# Patient Record
Sex: MALE | Race: BLACK OR AFRICAN AMERICAN | NOT HISPANIC OR LATINO | Employment: FULL TIME | URBAN - METROPOLITAN AREA
[De-identification: names, ages, dates, MRNs, and addresses within clinical notes are randomized per-mention and may not be internally consistent; named-entity substitution may affect disease eponyms.]

---

## 2017-01-24 ENCOUNTER — TELEPHONE (OUTPATIENT)
Dept: SPINE | Facility: CLINIC | Age: 27
End: 2017-01-24

## 2017-01-24 ENCOUNTER — OFFICE VISIT (OUTPATIENT)
Dept: INTERNAL MEDICINE | Facility: CLINIC | Age: 27
End: 2017-01-24
Payer: COMMERCIAL

## 2017-01-24 VITALS
HEART RATE: 75 BPM | TEMPERATURE: 99 F | SYSTOLIC BLOOD PRESSURE: 126 MMHG | WEIGHT: 297.38 LBS | DIASTOLIC BLOOD PRESSURE: 78 MMHG | HEIGHT: 71 IN | OXYGEN SATURATION: 96 % | BODY MASS INDEX: 41.63 KG/M2

## 2017-01-24 DIAGNOSIS — Z00.00 WELLNESS EXAMINATION: Primary | ICD-10-CM

## 2017-01-24 DIAGNOSIS — M54.16 LUMBAR RADICULITIS: ICD-10-CM

## 2017-01-24 DIAGNOSIS — M54.5 LOW BACK PAIN, UNSPECIFIED BACK PAIN LATERALITY, UNSPECIFIED CHRONICITY, WITH SCIATICA PRESENCE UNSPECIFIED: Primary | ICD-10-CM

## 2017-01-24 DIAGNOSIS — Z23 NEED FOR TDAP VACCINATION: ICD-10-CM

## 2017-01-24 PROCEDURE — 90715 TDAP VACCINE 7 YRS/> IM: CPT | Mod: S$GLB,,, | Performed by: INTERNAL MEDICINE

## 2017-01-24 PROCEDURE — 99395 PREV VISIT EST AGE 18-39: CPT | Mod: 25,S$GLB,, | Performed by: INTERNAL MEDICINE

## 2017-01-24 PROCEDURE — 90686 IIV4 VACC NO PRSV 0.5 ML IM: CPT | Mod: S$GLB,,, | Performed by: INTERNAL MEDICINE

## 2017-01-24 PROCEDURE — 99999 PR PBB SHADOW E&M-EST. PATIENT-LVL III: CPT | Mod: PBBFAC,,, | Performed by: INTERNAL MEDICINE

## 2017-01-24 PROCEDURE — 90472 IMMUNIZATION ADMIN EACH ADD: CPT | Mod: S$GLB,,, | Performed by: INTERNAL MEDICINE

## 2017-01-24 PROCEDURE — 90471 IMMUNIZATION ADMIN: CPT | Mod: S$GLB,,, | Performed by: INTERNAL MEDICINE

## 2017-01-24 RX ORDER — MELOXICAM 15 MG/1
15 TABLET ORAL DAILY PRN
Qty: 30 TABLET | Refills: 5 | Status: SHIPPED | OUTPATIENT
Start: 2017-01-24 | End: 2021-06-02 | Stop reason: SDUPTHER

## 2017-01-24 RX ORDER — GABAPENTIN 300 MG/1
300 CAPSULE ORAL 2 TIMES DAILY
Qty: 60 CAPSULE | Refills: 3 | Status: SHIPPED | OUTPATIENT
Start: 2017-01-24 | End: 2024-01-22
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
## 2017-01-24 NOTE — PROGRESS NOTES
Patient given Fluzone PF 0.5ml IM in the RD. Patient tolerated well and Band-Aid was applied. Lot#[id number] Exp:06/30/2017. Patient advised to wait in the lobby for 15 min to make sure no adverse reactions occur. Patient states verbal understanding and has no further questions.      Patient given Adacel 0.5ml IM in the LD. Patient tolerated well and Band-Aid was applied. Lot#[id number] Exp:12/10/2018. Patient advised to wait in the lobby for 15 min to make sure no adverse reactions occur. Patient states verbal understanding and has no further questions.

## 2017-01-24 NOTE — MR AVS SNAPSHOT
[facility]  [address]  [facility] [address]  Phone: [phone]  Fax: [phone]                  Perez Calabrese   2017 8:40 AM   Office Visit    Description:  Male : 1990   Provider:  John Rodriguez MD   Department:  [facility]           Reason for Visit     Low-back Pain     Hip Pain     Cough     Sputum Production           Diagnoses this Visit        Comments    Wellness examination    -  Primary     Need for Tdap vaccination         Lumbar radiculitis                To Do List           Goals (5 Years of Data)     None      Follow-Up and Disposition     Return in about 1 year (around 2018) for PE.       These Medications        Disp Refills Start End    meloxicam (MOBIC) 15 MG tablet 30 tablet 5 2017     Take 1 tablet (15 mg total) by mouth daily as needed for Pain. - Oral    Pharmacy: [id number] - [place], [place] - [address] Ph #: [phone]       gabapentin (NEURONTIN) 300 MG capsule 60 capsule 3 2017    Take 1 capsule (300 mg total) by mouth 2 (two) times daily. - Oral    Pharmacy: [id number] - [place], [place] - [address] Ph #: [phone]         [facility] On Call     [facility] On Call Nurse Care Line -  Assistance  Registered nurses in the [facility] On Call Center provide clinical advisement, health education, appointment booking, and other advisory services.  Call for this free service at [phone].             Medications           Message regarding Medications     Verify the changes and/or additions to your medication regime listed below are the same as discussed with your clinician today.  If any of these changes or additions are incorrect, please notify your healthcare provider.        START taking these NEW medications        Refills    meloxicam (MOBIC) 15 MG tablet 5     "Sig: Take 1 tablet (15 mg total) by mouth daily as needed for Pain.    Class: Normal    Route: Oral    gabapentin (NEURONTIN) 300 MG capsule 3    Sig: Take 1 capsule (300 mg total) by mouth 2 (two) times daily.    Class: Normal    Route: Oral           Verify that the below list of medications is an accurate representation of the medications you are currently taking.  If none reported, the list may be blank. If incorrect, please contact your healthcare provider. Carry this list with you in case of emergency.           Current Medications     gabapentin (NEURONTIN) 300 MG capsule Take 1 capsule (300 mg total) by mouth 2 (two) times daily.    meloxicam (MOBIC) 15 MG tablet Take 1 tablet (15 mg total) by mouth daily as needed for Pain.           Clinical Reference Information           Vital Signs - Last Recorded  Most recent update: 1/24/2017  8:56 AM by Traci Yusuf MA    BP Pulse Temp Ht Wt SpO2    126/78 75 98.6 °F (37 °C) (Oral) 5' 10.5" (1.791 m) 134.9 kg (297 lb 6.4 oz) 96%    BMI                42.07 kg/m2          Blood Pressure          Most Recent Value    BP  126/78      Allergies as of 1/24/2017     Banana Flavor (Bulk)    Pork/porcine Containing Products      Immunizations Administered on Date of Encounter - 1/24/2017     Name Date Dose VIS Date Route    TDAP 1/24/2017 0.5 mL 2/24/2015 Intramuscular    influenza - Quadrivalent - PF (ADULT) 1/24/2017 0.5 mL 8/7/2015 Intramuscular      Orders Placed During Today's Visit      Normal Orders This Visit    Ambulatory Referral to [facility] Spine Clinic     Influenza - Quadrivalent (3 years & older) (PF)     Tdap Vaccine (Adult)       "

## 2017-01-25 ENCOUNTER — HOSPITAL ENCOUNTER (OUTPATIENT)
Dept: RADIOLOGY | Facility: OTHER | Age: 27
Discharge: HOME | End: 2017-01-25
Attending: NEUROLOGICAL SURGERY
Payer: COMMERCIAL

## 2017-01-25 ENCOUNTER — OFFICE VISIT (OUTPATIENT)
Dept: SPINE | Facility: CLINIC | Age: 27
End: 2017-01-25
Payer: COMMERCIAL

## 2017-01-25 VITALS
WEIGHT: 297 LBS | HEIGHT: 71 IN | DIASTOLIC BLOOD PRESSURE: 61 MMHG | SYSTOLIC BLOOD PRESSURE: 130 MMHG | BODY MASS INDEX: 41.58 KG/M2 | HEART RATE: 77 BPM

## 2017-01-25 DIAGNOSIS — M54.14 THORACIC AND LUMBOSACRAL NEURITIS: ICD-10-CM

## 2017-01-25 DIAGNOSIS — M54.17 THORACIC AND LUMBOSACRAL NEURITIS: ICD-10-CM

## 2017-01-25 DIAGNOSIS — M54.5 LOW BACK PAIN, UNSPECIFIED BACK PAIN LATERALITY, UNSPECIFIED CHRONICITY, WITH SCIATICA PRESENCE UNSPECIFIED: ICD-10-CM

## 2017-01-25 DIAGNOSIS — M54.41 BILATERAL LOW BACK PAIN WITH BILATERAL SCIATICA, UNSPECIFIED CHRONICITY: ICD-10-CM

## 2017-01-25 DIAGNOSIS — M54.42 BILATERAL LOW BACK PAIN WITH BILATERAL SCIATICA, UNSPECIFIED CHRONICITY: ICD-10-CM

## 2017-01-25 PROCEDURE — 1159F MED LIST DOCD IN RCRD: CPT | Mod: S$GLB,,, | Performed by: PHYSICIAN ASSISTANT

## 2017-01-25 PROCEDURE — 72100 X-RAY EXAM L-S SPINE 2/3 VWS: CPT | Mod: TC

## 2017-01-25 PROCEDURE — 99204 OFFICE O/P NEW MOD 45 MIN: CPT | Mod: S$GLB,,, | Performed by: PHYSICIAN ASSISTANT

## 2017-01-25 PROCEDURE — 72100 X-RAY EXAM L-S SPINE 2/3 VWS: CPT | Mod: 26,,, | Performed by: RADIOLOGY

## 2017-01-25 PROCEDURE — 72120 X-RAY BEND ONLY L-S SPINE: CPT | Mod: 26,,, | Performed by: RADIOLOGY

## 2017-01-25 PROCEDURE — 99999 PR PBB SHADOW E&M-EST. PATIENT-LVL III: CPT | Mod: PBBFAC,,, | Performed by: PHYSICIAN ASSISTANT

## 2017-01-25 RX ORDER — METHYLPREDNISOLONE 4 MG/1
TABLET ORAL
Qty: 1 PACKAGE | Refills: 0 | Status: SHIPPED | OUTPATIENT
Start: 2017-01-25 | End: 2024-03-25

## 2017-01-25 NOTE — MR AVS SNAPSHOT
[facility]  [address]  [address]  [facility] [address]  Phone: [phone]  Fax: [phone]                  Perez Calabrese   2017 10:30 AM   Office Visit    Description:  Male : 1990   Provider:  Anya Parks PA-C   Department:  [facility]           Reason for Visit     Low-back Pain           Diagnoses this Visit        Comments    Thoracic and lumbosacral neuritis         Bilateral low back pain with bilateral sciatica, unspecified chronicity                To Do List           Goals (5 Years of Data)     None      Follow-Up and Disposition     Return if symptoms worsen or fail to improve.       These Medications        Disp Refills Start End    methylPREDNISolone (MEDROL DOSEPACK) 4 mg tablet 1 Package 0 2017     use as directed x 6 days. Do not take mobic/meloxicam with this medication.    Pharmacy: [id number] - [place], [place] - [address] Ph #: [phone]         [facility] On Call     [facility] On Call Nurse Care Line -  Assistance  Registered nurses in the [facility] On Call Center provide clinical advisement, health education, appointment booking, and other advisory services.  Call for this free service at [phone].             Medications           Message regarding Medications     Verify the changes and/or additions to your medication regime listed below are the same as discussed with your clinician today.  If any of these changes or additions are incorrect, please notify your healthcare provider.        START taking these NEW medications        Refills    methylPREDNISolone (MEDROL DOSEPACK) 4 mg tablet 0    Sig: use as directed x 6 days. Do not take mobic/meloxicam with this medication.    Class: Normal           Verify that the below list of medications is an accurate representation of the medications you are currently taking.  If none reported, the list may  "be blank. If incorrect, please contact your healthcare provider. Carry this list with you in case of emergency.           Current Medications     gabapentin (NEURONTIN) 300 MG capsule Take 1 capsule (300 mg total) by mouth 2 (two) times daily.    meloxicam (MOBIC) 15 MG tablet Take 1 tablet (15 mg total) by mouth daily as needed for Pain.    methylPREDNISolone (MEDROL DOSEPACK) 4 mg tablet use as directed x 6 days. Do not take mobic/meloxicam with this medication.           Clinical Reference Information           Vital Signs - Last Recorded  Most recent update: 1/25/2017 10:21 AM by David Palencia MA    BP Pulse Ht Wt BMI    130/61 77 5' 10.5" (1.791 m) 134.7 kg (297 lb) 42.01 kg/m2      Blood Pressure          Most Recent Value    BP  130/61      Allergies as of 1/25/2017     Banana Flavor (Bulk)    Pork/porcine Containing Products      Immunizations Administered on Date of Encounter - 1/25/2017     None      "

## 2017-01-25 NOTE — LETTER
January 25, 2017      John Rodriguez MD  [address]  [facility] [address]           [facility]  [address]  [address]  [facility] [address]  Phone: [phone]  Fax: [phone]          Patient: Perez Calabrese   MR Number: [id number]   YOB: 1990   Date of Visit: 1/25/2017       Dear Dr. John Rodriguez:    Thank you for referring Perez Calabrese to me for evaluation. Attached you will find relevant portions of my assessment and plan of care.    If you have questions, please do not hesitate to call me. I look forward to following Perez Calabrese along with you.    Sincerely,    Anya Parks PA-C    Enclosure  CC:  No Recipients    If you would like to receive this communication electronically, please contact [email or web address] or [phone] to request more information on [employer] access.    For providers and/or their staff who would like to refer a patient to [facility], please contact us through our one-stop-shop provider referral line, [facility], at [phone].    If you feel you have received this communication in error or would no longer like to receive these types of communications, please e-mail [email or web address]
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
## 2020-01-13 ENCOUNTER — PATIENT OUTREACH (OUTPATIENT)
Dept: ADMINISTRATIVE | Facility: HOSPITAL | Age: 30
End: 2020-01-13

## 2021-03-30 ENCOUNTER — IMMUNIZATION (OUTPATIENT)
Dept: PHARMACY | Facility: CLINIC | Age: 31
End: 2021-03-30
Payer: COMMERCIAL

## 2021-03-30 DIAGNOSIS — Z23 NEED FOR VACCINATION: Primary | ICD-10-CM

## 2021-04-27 ENCOUNTER — IMMUNIZATION (OUTPATIENT)
Dept: PHARMACY | Facility: CLINIC | Age: 31
End: 2021-04-27

## 2021-04-27 DIAGNOSIS — Z23 NEED FOR VACCINATION: Primary | ICD-10-CM

## 2021-06-01 ENCOUNTER — HOSPITAL ENCOUNTER (EMERGENCY)
Facility: HOSPITAL | Age: 31
Discharge: HOME | End: 2021-06-02
Attending: EMERGENCY MEDICINE

## 2021-06-01 VITALS
BODY MASS INDEX: 42.01 KG/M2 | TEMPERATURE: 99 F | HEART RATE: 99 BPM | DIASTOLIC BLOOD PRESSURE: 95 MMHG | OXYGEN SATURATION: 99 % | RESPIRATION RATE: 14 BRPM | WEIGHT: 297 LBS | SYSTOLIC BLOOD PRESSURE: 168 MMHG

## 2021-06-01 DIAGNOSIS — M79.606 LEG PAIN: ICD-10-CM

## 2021-06-01 DIAGNOSIS — M79.604 PAIN OF RIGHT LOWER EXTREMITY: Primary | ICD-10-CM

## 2021-06-01 LAB
BUN SERPL-MCNC: 15 MG/DL (ref 6–30)
CHLORIDE SERPL-SCNC: 103 MMOL/L (ref 95–110)
CREAT SERPL-MCNC: 1.2 MG/DL (ref 0.5–1.4)
GLUCOSE SERPL-MCNC: 111 MG/DL (ref 70–110)
HCT VFR BLD CALC: 51 %PCV (ref 36–54)
POC IONIZED CALCIUM: 1.22 MMOL/L (ref 1.06–1.42)
POC TCO2 (MEASURED): 27 MMOL/L (ref 23–29)
POTASSIUM BLD-SCNC: 3.9 MMOL/L (ref 3.5–5.1)
SAMPLE: ABNORMAL
SODIUM BLD-SCNC: 142 MMOL/L (ref 136–145)

## 2021-06-01 PROCEDURE — 99284 EMERGENCY DEPT VISIT MOD MDM: CPT | Mod: ,,, | Performed by: EMERGENCY MEDICINE

## 2021-06-01 PROCEDURE — 99284 PR EMERGENCY DEPT VISIT,LEVEL IV: ICD-10-PCS | Mod: ,,, | Performed by: EMERGENCY MEDICINE

## 2021-06-01 PROCEDURE — 80047 BASIC METABLC PNL IONIZED CA: CPT

## 2021-06-01 PROCEDURE — 86803 HEPATITIS C AB TEST: CPT | Performed by: NURSE PRACTITIONER

## 2021-06-01 PROCEDURE — 99284 EMERGENCY DEPT VISIT MOD MDM: CPT | Mod: 25

## 2021-06-01 PROCEDURE — 86703 HIV-1/HIV-2 1 RESULT ANTBDY: CPT | Performed by: NURSE PRACTITIONER

## 2021-06-01 RX ORDER — CYCLOBENZAPRINE HCL 10 MG
10 TABLET ORAL
Status: COMPLETED | OUTPATIENT
Start: 2021-06-02 | End: 2021-06-01

## 2021-06-01 RX ORDER — HYDROCODONE BITARTRATE AND ACETAMINOPHEN 5; 325 MG/1; MG/1
1 TABLET ORAL
Status: COMPLETED | OUTPATIENT
Start: 2021-06-02 | End: 2021-06-01

## 2021-06-01 RX ADMIN — CYCLOBENZAPRINE 10 MG: 10 TABLET, FILM COATED ORAL at 11:06

## 2021-06-01 RX ADMIN — HYDROCODONE BITARTRATE AND ACETAMINOPHEN 1 TABLET: 5; 325 TABLET ORAL at 11:06

## 2021-06-02 LAB
HCV AB SERPL QL IA: NEGATIVE
HIV 1+2 AB+HIV1 P24 AG SERPL QL IA: NEGATIVE

## 2021-06-02 PROCEDURE — 25000003 PHARM REV CODE 250: Performed by: EMERGENCY MEDICINE

## 2021-06-02 RX ORDER — MELOXICAM 15 MG/1
15 TABLET ORAL DAILY PRN
Qty: 30 TABLET | Refills: 5 | Status: SHIPPED | OUTPATIENT
Start: 2021-06-02 | End: 2024-03-25

## 2021-06-02 RX ORDER — CYCLOBENZAPRINE HCL 10 MG
10 TABLET ORAL 3 TIMES DAILY PRN
Qty: 15 TABLET | Refills: 0 | Status: SHIPPED | OUTPATIENT
Start: 2021-06-02 | End: 2021-06-07

## 2023-01-17 ENCOUNTER — OFFICE VISIT (OUTPATIENT)
Dept: URGENT CARE | Facility: CLINIC | Age: 33
End: 2023-01-17
Payer: COMMERCIAL

## 2023-01-17 VITALS
HEIGHT: 63 IN | WEIGHT: 270 LBS | TEMPERATURE: 98 F | RESPIRATION RATE: 16 BRPM | SYSTOLIC BLOOD PRESSURE: 157 MMHG | OXYGEN SATURATION: 98 % | HEART RATE: 92 BPM | DIASTOLIC BLOOD PRESSURE: 106 MMHG | BODY MASS INDEX: 47.84 KG/M2

## 2023-01-17 DIAGNOSIS — S69.91XA HAND INJURIES, RIGHT, INITIAL ENCOUNTER: Primary | ICD-10-CM

## 2023-01-17 PROCEDURE — 3008F PR BODY MASS INDEX (BMI) DOCUMENTED: ICD-10-PCS | Mod: CPTII,S$GLB,, | Performed by: NURSE PRACTITIONER

## 2023-01-17 PROCEDURE — 73130 X-RAY EXAM OF HAND: CPT | Mod: FY,RT,S$GLB, | Performed by: RADIOLOGY

## 2023-01-17 PROCEDURE — 3080F PR MOST RECENT DIASTOLIC BLOOD PRESSURE >= 90 MM HG: ICD-10-PCS | Mod: CPTII,S$GLB,, | Performed by: NURSE PRACTITIONER

## 2023-01-17 PROCEDURE — 3077F SYST BP >= 140 MM HG: CPT | Mod: CPTII,S$GLB,, | Performed by: NURSE PRACTITIONER

## 2023-01-17 PROCEDURE — 1159F MED LIST DOCD IN RCRD: CPT | Mod: CPTII,S$GLB,, | Performed by: NURSE PRACTITIONER

## 2023-01-17 PROCEDURE — 1159F PR MEDICATION LIST DOCUMENTED IN MEDICAL RECORD: ICD-10-PCS | Mod: CPTII,S$GLB,, | Performed by: NURSE PRACTITIONER

## 2023-01-17 PROCEDURE — 3008F BODY MASS INDEX DOCD: CPT | Mod: CPTII,S$GLB,, | Performed by: NURSE PRACTITIONER

## 2023-01-17 PROCEDURE — 99203 OFFICE O/P NEW LOW 30 MIN: CPT | Mod: S$GLB,,, | Performed by: NURSE PRACTITIONER

## 2023-01-17 PROCEDURE — 1160F RVW MEDS BY RX/DR IN RCRD: CPT | Mod: CPTII,S$GLB,, | Performed by: NURSE PRACTITIONER

## 2023-01-17 PROCEDURE — 73130 XR HAND COMPLETE 3 VIEW RIGHT: ICD-10-PCS | Mod: FY,RT,S$GLB, | Performed by: RADIOLOGY

## 2023-01-17 PROCEDURE — 1160F PR REVIEW ALL MEDS BY PRESCRIBER/CLIN PHARMACIST DOCUMENTED: ICD-10-PCS | Mod: CPTII,S$GLB,, | Performed by: NURSE PRACTITIONER

## 2023-01-17 PROCEDURE — 3080F DIAST BP >= 90 MM HG: CPT | Mod: CPTII,S$GLB,, | Performed by: NURSE PRACTITIONER

## 2023-01-17 PROCEDURE — 73110 X-RAY EXAM OF WRIST: CPT | Mod: FY,RT,S$GLB, | Performed by: RADIOLOGY

## 2023-01-17 PROCEDURE — 3077F PR MOST RECENT SYSTOLIC BLOOD PRESSURE >= 140 MM HG: ICD-10-PCS | Mod: CPTII,S$GLB,, | Performed by: NURSE PRACTITIONER

## 2023-01-17 PROCEDURE — 73110 XR WRIST COMPLETE 3 VIEWS RIGHT: ICD-10-PCS | Mod: FY,RT,S$GLB, | Performed by: RADIOLOGY

## 2023-01-17 PROCEDURE — 99203 PR OFFICE/OUTPT VISIT, NEW, LEVL III, 30-44 MIN: ICD-10-PCS | Mod: S$GLB,,, | Performed by: NURSE PRACTITIONER

## 2023-01-17 NOTE — PATIENT INSTRUCTIONS
Rest  Ice packs  Compression, finger spling  Elevate the hand when at rest  Ibuprofen or tylenol for pain
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
## 2023-12-27 ENCOUNTER — OFFICE VISIT (OUTPATIENT)
Dept: INTERNAL MEDICINE | Facility: CLINIC | Age: 33
End: 2023-12-27
Payer: COMMERCIAL

## 2023-12-27 VITALS
OXYGEN SATURATION: 97 % | HEART RATE: 93 BPM | WEIGHT: 315 LBS | DIASTOLIC BLOOD PRESSURE: 80 MMHG | SYSTOLIC BLOOD PRESSURE: 130 MMHG | BODY MASS INDEX: 45.1 KG/M2 | HEIGHT: 70 IN

## 2023-12-27 DIAGNOSIS — M54.31 SCIATICA OF RIGHT SIDE: ICD-10-CM

## 2023-12-27 DIAGNOSIS — Z76.89 ENCOUNTER TO ESTABLISH CARE WITH NEW DOCTOR: ICD-10-CM

## 2023-12-27 DIAGNOSIS — Z23 NEED FOR VACCINATION: ICD-10-CM

## 2023-12-27 DIAGNOSIS — M54.16 LUMBAR RADICULOPATHY: ICD-10-CM

## 2023-12-27 DIAGNOSIS — Z00.01 ENCOUNTER FOR ANNUAL GENERAL MEDICAL EXAMINATION WITH ABNORMAL FINDINGS IN ADULT: Primary | ICD-10-CM

## 2023-12-27 PROCEDURE — 99385 PR PREVENTIVE VISIT,NEW,18-39: ICD-10-PCS | Mod: 25,S$GLB,, | Performed by: FAMILY MEDICINE

## 2023-12-27 PROCEDURE — 3008F BODY MASS INDEX DOCD: CPT | Mod: CPTII,S$GLB,, | Performed by: FAMILY MEDICINE

## 2023-12-27 PROCEDURE — 3008F PR BODY MASS INDEX (BMI) DOCUMENTED: ICD-10-PCS | Mod: CPTII,S$GLB,, | Performed by: FAMILY MEDICINE

## 2023-12-27 PROCEDURE — 3079F PR MOST RECENT DIASTOLIC BLOOD PRESSURE 80-89 MM HG: ICD-10-PCS | Mod: CPTII,S$GLB,, | Performed by: FAMILY MEDICINE

## 2023-12-27 PROCEDURE — 99385 PREV VISIT NEW AGE 18-39: CPT | Mod: 25,S$GLB,, | Performed by: FAMILY MEDICINE

## 2023-12-27 PROCEDURE — 1160F RVW MEDS BY RX/DR IN RCRD: CPT | Mod: CPTII,S$GLB,, | Performed by: FAMILY MEDICINE

## 2023-12-27 PROCEDURE — 3079F DIAST BP 80-89 MM HG: CPT | Mod: CPTII,S$GLB,, | Performed by: FAMILY MEDICINE

## 2023-12-27 PROCEDURE — 99999 PR PBB SHADOW E&M-EST. PATIENT-LVL IV: CPT | Mod: PBBFAC,,, | Performed by: FAMILY MEDICINE

## 2023-12-27 PROCEDURE — 99214 PR OFFICE/OUTPT VISIT, EST, LEVL IV, 30-39 MIN: ICD-10-PCS | Mod: 25,S$GLB,, | Performed by: FAMILY MEDICINE

## 2023-12-27 PROCEDURE — 99214 OFFICE O/P EST MOD 30 MIN: CPT | Mod: 25,S$GLB,, | Performed by: FAMILY MEDICINE

## 2023-12-27 PROCEDURE — 1160F PR REVIEW ALL MEDS BY PRESCRIBER/CLIN PHARMACIST DOCUMENTED: ICD-10-PCS | Mod: CPTII,S$GLB,, | Performed by: FAMILY MEDICINE

## 2023-12-27 PROCEDURE — 3075F SYST BP GE 130 - 139MM HG: CPT | Mod: CPTII,S$GLB,, | Performed by: FAMILY MEDICINE

## 2023-12-27 PROCEDURE — 99999 PR PBB SHADOW E&M-EST. PATIENT-LVL IV: ICD-10-PCS | Mod: PBBFAC,,, | Performed by: FAMILY MEDICINE

## 2023-12-27 PROCEDURE — 1159F PR MEDICATION LIST DOCUMENTED IN MEDICAL RECORD: ICD-10-PCS | Mod: CPTII,S$GLB,, | Performed by: FAMILY MEDICINE

## 2023-12-27 PROCEDURE — 1159F MED LIST DOCD IN RCRD: CPT | Mod: CPTII,S$GLB,, | Performed by: FAMILY MEDICINE

## 2023-12-27 PROCEDURE — 3075F PR MOST RECENT SYSTOLIC BLOOD PRESS GE 130-139MM HG: ICD-10-PCS | Mod: CPTII,S$GLB,, | Performed by: FAMILY MEDICINE

## 2023-12-27 RX ORDER — CYCLOBENZAPRINE HCL 10 MG
10 TABLET ORAL 3 TIMES DAILY
COMMUNITY
Start: 2023-12-20

## 2023-12-27 RX ORDER — MELOXICAM 15 MG/1
15 TABLET ORAL DAILY PRN
COMMUNITY
Start: 2023-12-20

## 2023-12-27 RX ORDER — PREDNISONE 20 MG/1
20 TABLET ORAL DAILY
Qty: 20 TABLET | Refills: 0 | Status: SHIPPED | OUTPATIENT
Start: 2023-12-27 | End: 2024-01-06
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
## 2024-01-09 ENCOUNTER — IMMUNIZATION (OUTPATIENT)
Dept: INTERNAL MEDICINE | Facility: CLINIC | Age: 34
End: 2024-01-09
Payer: COMMERCIAL

## 2024-01-09 ENCOUNTER — LAB VISIT (OUTPATIENT)
Dept: LAB | Facility: HOSPITAL | Age: 34
End: 2024-01-09
Attending: FAMILY MEDICINE
Payer: COMMERCIAL

## 2024-01-09 ENCOUNTER — HOSPITAL ENCOUNTER (OUTPATIENT)
Dept: RADIOLOGY | Facility: HOSPITAL | Age: 34
Discharge: HOME | End: 2024-01-09
Attending: FAMILY MEDICINE
Payer: COMMERCIAL

## 2024-01-09 DIAGNOSIS — M54.16 LUMBAR RADICULOPATHY: ICD-10-CM

## 2024-01-09 DIAGNOSIS — Z23 NEED FOR VACCINATION: Primary | ICD-10-CM

## 2024-01-09 DIAGNOSIS — M54.31 SCIATICA OF RIGHT SIDE: ICD-10-CM

## 2024-01-09 DIAGNOSIS — Z00.01 ENCOUNTER FOR ANNUAL GENERAL MEDICAL EXAMINATION WITH ABNORMAL FINDINGS IN ADULT: ICD-10-CM

## 2024-01-09 LAB
ALBUMIN SERPL BCP-MCNC: 4.2 G/DL (ref 3.5–5.2)
ALP SERPL-CCNC: 67 U/L (ref 55–135)
ALT SERPL W/O P-5'-P-CCNC: 35 U/L (ref 10–44)
ANION GAP SERPL CALC-SCNC: 9 MMOL/L (ref 8–16)
AST SERPL-CCNC: 22 U/L (ref 10–40)
BILIRUB SERPL-MCNC: 0.4 MG/DL (ref 0.1–1)
BUN SERPL-MCNC: 15 MG/DL (ref 6–20)
CALCIUM SERPL-MCNC: 9.6 MG/DL (ref 8.7–10.5)
CHLORIDE SERPL-SCNC: 105 MMOL/L (ref 95–110)
CHOLEST SERPL-MCNC: 217 MG/DL (ref 120–199)
CHOLEST/HDLC SERPL: 5.3 {RATIO} (ref 2–5)
CO2 SERPL-SCNC: 22 MMOL/L (ref 23–29)
CREAT SERPL-MCNC: 0.9 MG/DL (ref 0.5–1.4)
ERYTHROCYTE [DISTWIDTH] IN BLOOD BY AUTOMATED COUNT: 13.6 % (ref 11.5–14.5)
EST. GFR  (NO RACE VARIABLE): >60 ML/MIN/1.73 M^2
ESTIMATED AVG GLUCOSE: 140 MG/DL (ref 68–131)
GLUCOSE SERPL-MCNC: 114 MG/DL (ref 70–110)
HBA1C MFR BLD: 6.5 % (ref 4–5.6)
HCT VFR BLD AUTO: 50.7 % (ref 40–54)
HCV AB SERPL QL IA: NORMAL
HDLC SERPL-MCNC: 41 MG/DL (ref 40–75)
HDLC SERPL: 18.9 % (ref 20–50)
HGB BLD-MCNC: 16.3 G/DL (ref 14–18)
HIV 1+2 AB+HIV1 P24 AG SERPL QL IA: NORMAL
LDLC SERPL CALC-MCNC: 145.8 MG/DL (ref 63–159)
MCH RBC QN AUTO: 29.5 PG (ref 27–31)
MCHC RBC AUTO-ENTMCNC: 32.1 G/DL (ref 32–36)
MCV RBC AUTO: 92 FL (ref 82–98)
NONHDLC SERPL-MCNC: 176 MG/DL
PLATELET # BLD AUTO: 237 K/UL (ref 150–450)
PMV BLD AUTO: 10.9 FL (ref 9.2–12.9)
POTASSIUM SERPL-SCNC: 4.2 MMOL/L (ref 3.5–5.1)
PROT SERPL-MCNC: 7.8 G/DL (ref 6–8.4)
RBC # BLD AUTO: 5.52 M/UL (ref 4.6–6.2)
SODIUM SERPL-SCNC: 136 MMOL/L (ref 136–145)
T4 FREE SERPL-MCNC: 0.99 NG/DL (ref 0.71–1.51)
TRIGL SERPL-MCNC: 151 MG/DL (ref 30–150)
TSH SERPL DL<=0.005 MIU/L-ACNC: 0.48 UIU/ML (ref 0.4–4)
WBC # BLD AUTO: 10.97 K/UL (ref 3.9–12.7)

## 2024-01-09 PROCEDURE — 90686 IIV4 VACC NO PRSV 0.5 ML IM: CPT | Mod: S$GLB,,, | Performed by: INTERNAL MEDICINE

## 2024-01-09 PROCEDURE — 72100 X-RAY EXAM L-S SPINE 2/3 VWS: CPT | Mod: TC

## 2024-01-09 PROCEDURE — 91322 SARSCOV2 VAC 50 MCG/0.5ML IM: CPT | Mod: S$GLB,,, | Performed by: INTERNAL MEDICINE

## 2024-01-09 PROCEDURE — 72148 MRI LUMBAR SPINE W/O DYE: CPT | Mod: 26,,, | Performed by: RADIOLOGY

## 2024-01-09 PROCEDURE — 80061 LIPID PANEL: CPT | Performed by: FAMILY MEDICINE

## 2024-01-09 PROCEDURE — 36415 COLL VENOUS BLD VENIPUNCTURE: CPT | Performed by: FAMILY MEDICINE

## 2024-01-09 PROCEDURE — 72100 X-RAY EXAM L-S SPINE 2/3 VWS: CPT | Mod: 26,,, | Performed by: RADIOLOGY

## 2024-01-09 PROCEDURE — 80053 COMPREHEN METABOLIC PANEL: CPT | Performed by: FAMILY MEDICINE

## 2024-01-09 PROCEDURE — 83036 HEMOGLOBIN GLYCOSYLATED A1C: CPT | Performed by: FAMILY MEDICINE

## 2024-01-09 PROCEDURE — 84443 ASSAY THYROID STIM HORMONE: CPT | Performed by: FAMILY MEDICINE

## 2024-01-09 PROCEDURE — 85027 COMPLETE CBC AUTOMATED: CPT | Performed by: FAMILY MEDICINE

## 2024-01-09 PROCEDURE — G0008 ADMIN INFLUENZA VIRUS VAC: HCPCS | Mod: S$GLB,,, | Performed by: INTERNAL MEDICINE

## 2024-01-09 PROCEDURE — 90480 ADMN SARSCOV2 VAC 1/ONLY CMP: CPT | Mod: S$GLB,,, | Performed by: INTERNAL MEDICINE

## 2024-01-09 PROCEDURE — 72148 MRI LUMBAR SPINE W/O DYE: CPT | Mod: TC

## 2024-01-09 PROCEDURE — 84439 ASSAY OF FREE THYROXINE: CPT | Performed by: FAMILY MEDICINE

## 2024-01-09 PROCEDURE — 86803 HEPATITIS C AB TEST: CPT | Performed by: FAMILY MEDICINE

## 2024-01-09 PROCEDURE — 87389 HIV-1 AG W/HIV-1&-2 AB AG IA: CPT | Performed by: FAMILY MEDICINE

## 2024-01-10 ENCOUNTER — PATIENT MESSAGE (OUTPATIENT)
Dept: INTERNAL MEDICINE | Facility: CLINIC | Age: 34
End: 2024-01-10
Payer: COMMERCIAL

## 2024-01-10 DIAGNOSIS — M54.31 SCIATICA OF RIGHT SIDE: ICD-10-CM

## 2024-01-10 DIAGNOSIS — M54.16 LUMBAR RADICULOPATHY: Primary | ICD-10-CM

## 2024-01-22 ENCOUNTER — OFFICE VISIT (OUTPATIENT)
Dept: ORTHOPEDICS | Facility: CLINIC | Age: 34
End: 2024-01-22
Payer: COMMERCIAL

## 2024-01-22 ENCOUNTER — PATIENT MESSAGE (OUTPATIENT)
Dept: INTERNAL MEDICINE | Facility: CLINIC | Age: 34
End: 2024-01-22
Payer: COMMERCIAL

## 2024-01-22 VITALS — WEIGHT: 315 LBS | BODY MASS INDEX: 45.1 KG/M2 | HEIGHT: 70 IN

## 2024-01-22 DIAGNOSIS — M54.16 LUMBAR RADICULOPATHY: ICD-10-CM

## 2024-01-22 DIAGNOSIS — M54.31 SCIATICA OF RIGHT SIDE: ICD-10-CM

## 2024-01-22 DIAGNOSIS — R73.9 HYPERGLYCEMIA: Primary | ICD-10-CM

## 2024-01-22 DIAGNOSIS — E78.2 MIXED HYPERLIPIDEMIA: ICD-10-CM

## 2024-01-22 PROCEDURE — 99999 PR PBB SHADOW E&M-EST. PATIENT-LVL III: CPT | Mod: PBBFAC,,, | Performed by: ORTHOPAEDIC SURGERY

## 2024-01-22 PROCEDURE — 3044F HG A1C LEVEL LT 7.0%: CPT | Mod: CPTII,S$GLB,, | Performed by: ORTHOPAEDIC SURGERY

## 2024-01-22 PROCEDURE — 1159F MED LIST DOCD IN RCRD: CPT | Mod: CPTII,S$GLB,, | Performed by: ORTHOPAEDIC SURGERY

## 2024-01-22 PROCEDURE — 3008F BODY MASS INDEX DOCD: CPT | Mod: CPTII,S$GLB,, | Performed by: ORTHOPAEDIC SURGERY

## 2024-01-22 PROCEDURE — 99204 OFFICE O/P NEW MOD 45 MIN: CPT | Mod: S$GLB,,, | Performed by: ORTHOPAEDIC SURGERY

## 2024-01-22 RX ORDER — GABAPENTIN 300 MG/1
300 CAPSULE ORAL 3 TIMES DAILY
Qty: 90 CAPSULE | Refills: 11 | Status: SHIPPED | OUTPATIENT
Start: 2024-01-22 | End: 2025-01-21

## 2024-01-22 NOTE — PROGRESS NOTES
DATE: 1/22/2024  PATIENT: Perez Calabrese Sr.    Supervising Physician: Car Lyon M.D.    CHIEF COMPLAINT: right leg pain    HISTORY:  Perez Calabrese Sr. is a 33 y.o. male here for initial evaluation of low back and right leg pain (Back - 6, Leg - 6).  The pain in the back of the right leg is what bothers him most.  The pain has been present intermittently for years, worsening a few months ago when patient bent down to pick something up. The patient describes the pain as burning down the back of his entire right leg.  The pain is worse with activity and improved by leaning forward. There is positive associated numbness and tingling. There is positive subjective weakness. Prior treatments have included a recent course of prednisone tablets and remote hx of PT and ESIs with good relief, but no surgery.    The patient denies myelopathic symptoms such as handwriting changes or difficulty with buttons/coins/keys. Denies perineal paresthesias, bowel/bladder dysfunction.    PAST MEDICAL/SURGICAL HISTORY:  Past Medical History:   Diagnosis Date    Allergy     Phimosis      Past Surgical History:   Procedure Laterality Date    shoulder labrum Right 2009    SHOULDER SURGERY      SHOULDER SURGERY Right        Medications:   Current Outpatient Medications on File Prior to Visit   Medication Sig Dispense Refill    cyclobenzaprine (FLEXERIL) 10 MG tablet Take 10 mg by mouth 3 (three) times daily.      gabapentin (NEURONTIN) 300 MG capsule Take 1 capsule (300 mg total) by mouth 2 (two) times daily. 60 capsule 3    meloxicam (MOBIC) 15 MG tablet Take 1 tablet (15 mg total) by mouth daily as needed for Pain. 30 tablet 5    meloxicam (MOBIC) 15 MG tablet Take 15 mg by mouth daily as needed for Pain.      methylPREDNISolone (MEDROL DOSEPACK) 4 mg tablet use as directed x 6 days. Do not take mobic/meloxicam with this medication. 1 Package 0     No current facility-administered medications on file prior to visit.       Social History:    Social History     Socioeconomic History    Marital status: Unknown   Occupational History     Employer: [employer]   Tobacco Use    Smoking status: Never    Smokeless tobacco: Never   Substance and Sexual Activity    Alcohol use: Yes     Alcohol/week: 2.0 standard drinks of alcohol     Types: 2 Standard drinks or equivalent per week     Comment: social    Drug use: Not Currently     Types: Marijuana    Sexual activity: Yes     Partners: Female     Birth control/protection: OCP   Social History Narrative    ** Merged History Encounter **         ** Merged History Encounter **            REVIEW OF SYSTEMS:  Constitution: Negative. Negative for chills, fever and night sweats.   Cardiovascular: Negative for chest pain and syncope.   Respiratory: Negative for cough and shortness of breath.   Gastrointestinal: See HPI. Negative for nausea/vomiting. Negative for abdominal pain.  Genitourinary: See HPI. Negative for discoloration or dysuria.  Skin: Negative for dry skin, itching and rash.   Hematologic/Lymphatic: Negative for bleeding problem. Does not bruise/bleed easily.   Musculoskeletal: Negative for falls and muscle weakness.   Neurological: See HPI. No seizures.   Endocrine: Negative for polydipsia, polyphagia and polyuria.   Allergic/Immunologic: Negative for hives and persistent infections.     EXAM:  There were no vitals taken for this visit.    General: The patient is a very pleasant 33 y.o. male in no apparent distress, the patient is oriented to person, place and time.  Psych: Normal mood and affect  HEENT: Vision grossly intact, hearing intact to the spoken word.  Lungs: Respirations unlabored.  Gait: Antalgic station and gait.  Skin: Dorsal lumbar skin negative for rashes, lesions, hairy patches and surgical scars. There is mild lumbar tenderness to palpation.  Range of motion: Lumbar range of motion is acceptable.  Spinal Balance: Global saggital and coronal spinal balance acceptable, not significant  for scoliosis and kyphosis.  Musculoskeletal: No pain with the range of motion of the bilateral hips. No trochanteric tenderness to palpation.  Vascular: Bilateral lower extremities warm and well perfused, dorsalis pedis pulses 2+ bilaterally.  Neurological: Normal strength and tone in all major motor groups in the bilateral lower extremities. Altered sensation to light touch in the L4-S1 dermatomes in RLE compared to LLE  Deep tendon reflexes symmetric 2+ in the bilateral lower extremities.  Negative Babinski bilaterally. Straight leg raise positive on right    IMAGING:      Today I personally reviewed AP, Lat and Flex/Ex  upright L-spine films that demonstrate minimal degenerative changes    MRI lumbar demonstrates right subarticular disc extrusion at L5-S1 that effaces the right lateral recess and compresses the right descending S1 nerve root. Posterior broad-based disc protrusion at L3-L4 that contributes to mild spinal canal and lateral recess stenosis and closely approximates the bilateral descending L4 nerve roots.     There is no height or weight on file to calculate BMI.    Hemoglobin A1C   Date Value Ref Range Status   01/09/2024 6.5 (H) 4.0 - 5.6 % Final     Comment:     ADA Screening Guidelines:  5.7-6.4%  Consistent with prediabetes  >or=6.5%  Consistent with diabetes    High levels of fetal hemoglobin interfere with the HbA1C  assay. Heterozygous hemoglobin variants (HbS, HgC, etc)do  not significantly interfere with this assay.   However, presence of multiple variants may affect accuracy.     08/20/2015 5.8 4.5 - 6.2 % Final           ASSESSMENT/PLAN:    There are no diagnoses linked to this encounter.    Today we discussed at length all of the different treatment options including anti-inflammatories, acetaminophen, rest, ice, heat, physical therapy including strengthening and stretching exercises, home exercises, ROM, aerobic conditioning, aqua therapy, other modalities including ultrasound,  massage, and dry needling, epidural steroid injections and finally surgical intervention.      Pt presents with acute on chronic lumbar radiculopathy with neuro deficits secondary to HNP at L5-S1. Will order right L5-S1 TFESI with pain management and send gabapentin to pharmacy. Pt will fu if pain persists.

## 2024-01-22 NOTE — H&P (VIEW-ONLY)
DATE: 1/22/2024  PATIENT: Perez Calabrese Sr.    Supervising Physician: Car Lyon M.D.    CHIEF COMPLAINT: right leg pain    HISTORY:  Perez Calabrese Sr. is a 33 y.o. male here for initial evaluation of low back and right leg pain (Back - 6, Leg - 6).  The pain in the back of the right leg is what bothers him most.  The pain has been present intermittently for years, worsening a few months ago when patient bent down to pick something up. The patient describes the pain as burning down the back of his entire right leg.  The pain is worse with activity and improved by leaning forward. There is positive associated numbness and tingling. There is positive subjective weakness. Prior treatments have included a recent course of prednisone tablets and remote hx of PT and ESIs with good relief, but no surgery.    The patient denies myelopathic symptoms such as handwriting changes or difficulty with buttons/coins/keys. Denies perineal paresthesias, bowel/bladder dysfunction.    PAST MEDICAL/SURGICAL HISTORY:  Past Medical History:   Diagnosis Date    Allergy     Phimosis      Past Surgical History:   Procedure Laterality Date    shoulder labrum Right 2009    SHOULDER SURGERY      SHOULDER SURGERY Right        Medications:   Current Outpatient Medications on File Prior to Visit   Medication Sig Dispense Refill    cyclobenzaprine (FLEXERIL) 10 MG tablet Take 10 mg by mouth 3 (three) times daily.      gabapentin (NEURONTIN) 300 MG capsule Take 1 capsule (300 mg total) by mouth 2 (two) times daily. 60 capsule 3    meloxicam (MOBIC) 15 MG tablet Take 1 tablet (15 mg total) by mouth daily as needed for Pain. 30 tablet 5    meloxicam (MOBIC) 15 MG tablet Take 15 mg by mouth daily as needed for Pain.      methylPREDNISolone (MEDROL DOSEPACK) 4 mg tablet use as directed x 6 days. Do not take mobic/meloxicam with this medication. 1 Package 0     No current facility-administered medications on file prior to visit.       Social History:    Social History     Socioeconomic History    Marital status: Unknown   Occupational History     Employer: [employer]   Tobacco Use    Smoking status: Never    Smokeless tobacco: Never   Substance and Sexual Activity    Alcohol use: Yes     Alcohol/week: 2.0 standard drinks of alcohol     Types: 2 Standard drinks or equivalent per week     Comment: social    Drug use: Not Currently     Types: Marijuana    Sexual activity: Yes     Partners: Female     Birth control/protection: OCP   Social History Narrative    ** Merged History Encounter **         ** Merged History Encounter **            REVIEW OF SYSTEMS:  Constitution: Negative. Negative for chills, fever and night sweats.   Cardiovascular: Negative for chest pain and syncope.   Respiratory: Negative for cough and shortness of breath.   Gastrointestinal: See HPI. Negative for nausea/vomiting. Negative for abdominal pain.  Genitourinary: See HPI. Negative for discoloration or dysuria.  Skin: Negative for dry skin, itching and rash.   Hematologic/Lymphatic: Negative for bleeding problem. Does not bruise/bleed easily.   Musculoskeletal: Negative for falls and muscle weakness.   Neurological: See HPI. No seizures.   Endocrine: Negative for polydipsia, polyphagia and polyuria.   Allergic/Immunologic: Negative for hives and persistent infections.     EXAM:  There were no vitals taken for this visit.    General: The patient is a very pleasant 33 y.o. male in no apparent distress, the patient is oriented to person, place and time.  Psych: Normal mood and affect  HEENT: Vision grossly intact, hearing intact to the spoken word.  Lungs: Respirations unlabored.  Gait: Antalgic station and gait.  Skin: Dorsal lumbar skin negative for rashes, lesions, hairy patches and surgical scars. There is mild lumbar tenderness to palpation.  Range of motion: Lumbar range of motion is acceptable.  Spinal Balance: Global saggital and coronal spinal balance acceptable, not significant  for scoliosis and kyphosis.  Musculoskeletal: No pain with the range of motion of the bilateral hips. No trochanteric tenderness to palpation.  Vascular: Bilateral lower extremities warm and well perfused, dorsalis pedis pulses 2+ bilaterally.  Neurological: Normal strength and tone in all major motor groups in the bilateral lower extremities. Altered sensation to light touch in the L4-S1 dermatomes in RLE compared to LLE  Deep tendon reflexes symmetric 2+ in the bilateral lower extremities.  Negative Babinski bilaterally. Straight leg raise positive on right    IMAGING:      Today I personally reviewed AP, Lat and Flex/Ex  upright L-spine films that demonstrate minimal degenerative changes    MRI lumbar demonstrates right subarticular disc extrusion at L5-S1 that effaces the right lateral recess and compresses the right descending S1 nerve root. Posterior broad-based disc protrusion at L3-L4 that contributes to mild spinal canal and lateral recess stenosis and closely approximates the bilateral descending L4 nerve roots.     There is no height or weight on file to calculate BMI.    Hemoglobin A1C   Date Value Ref Range Status   01/09/2024 6.5 (H) 4.0 - 5.6 % Final     Comment:     ADA Screening Guidelines:  5.7-6.4%  Consistent with prediabetes  >or=6.5%  Consistent with diabetes    High levels of fetal hemoglobin interfere with the HbA1C  assay. Heterozygous hemoglobin variants (HbS, HgC, etc)do  not significantly interfere with this assay.   However, presence of multiple variants may affect accuracy.     08/20/2015 5.8 4.5 - 6.2 % Final           ASSESSMENT/PLAN:    There are no diagnoses linked to this encounter.    Today we discussed at length all of the different treatment options including anti-inflammatories, acetaminophen, rest, ice, heat, physical therapy including strengthening and stretching exercises, home exercises, ROM, aerobic conditioning, aqua therapy, other modalities including ultrasound,  massage, and dry needling, epidural steroid injections and finally surgical intervention.      Pt presents with acute on chronic lumbar radiculopathy with neuro deficits secondary to HNP at L5-S1. Will order right L5-S1 TFESI with pain management and send gabapentin to pharmacy. Pt will fu if pain persists.

## 2024-01-23 ENCOUNTER — TELEPHONE (OUTPATIENT)
Dept: PAIN MEDICINE | Facility: CLINIC | Age: 34
End: 2024-01-23
Payer: COMMERCIAL

## 2024-01-23 DIAGNOSIS — M54.16 LUMBAR RADICULOPATHY: Primary | ICD-10-CM

## 2024-01-23 NOTE — TELEPHONE ENCOUNTER
----- Message from Gisselle Lazaro PA-C sent at 2024  2:20 PM CST -----  Regarding: Order for MANJU MONK SR.    Patient Name: MANJU MONK SR.([id number])  Sex: Male  : 1990      PCP: DANA FUENTES    Center: [facility]     Types of orders made on 2024: Medications, Outpatient Referral, Procedure                                       Request    Order Date:2024  Ordering User:GISSELLE LAZARO [[id number]]  Encoun  ter Provider:Gisselle Lazaro PA-C [[id number]]  Authorizing Provider: Gisselle Lazaro PA-C [[id number]]  Supervising Provider:NGOC ECHEVERRIA [[id number]]  Type of Supervision:Supervision Required  Department:[facility][[id number]]    Common Order Information  Procedure -> Transforaminal Injection (Specify level and laterality) Cmt: right             L5-S1    Order Specific Information  Order: Procedure Ord  er to Pain Management [Custom: [id number]]  Order #:          [id number]: 1 FUTURE    Priority: Routine  Class: Clinic Performed    Future Order Information      Expires on:2025            Expected by:2024                   Comment:ASAP, pt is miserable, large disc herniation    Associated Diagnoses      M54.16 Lumbar radiculopathy      M54.31 Sciatica of right side      [facility]: [facility]           Priority: Routine  Class: Clinic Performed    Future Order Information      Expires on:2025            Expected by:2024                   Comment:ASAP, pt is miserable, large disc herniation    Associated Diagnoses      M54.16 Lumbar radiculopathy      M54.31 Sciatica of right side      Procedure -> Transforaminal Injection (Specify level and laterality) Cmt:                 right   L5-S1        Facility Name: -> [facility]

## 2024-01-26 ENCOUNTER — TELEPHONE (OUTPATIENT)
Dept: PAIN MEDICINE | Facility: CLINIC | Age: 34
End: 2024-01-26
Payer: COMMERCIAL

## 2024-02-01 NOTE — PRE-PROCEDURE INSTRUCTIONS
Unable to reach pt via phone.  Left voicemail with arrival time also informing pt of need for responsible  accompaniment and instructing pt to follow pre-procedure instructions provided via [facility] portal.  The following messages was sent to pt's portal.       Dear Perez ,  You are scheduled for a pain procedure on 2/2 with Dr. Harrell, please report to [facility]   [address] [facility].     Please park in the lot in front of the building and enter at the main entrance. Proceed to the second floor for registration.     Arrival time: 9:30 am     Anesthesia fasting instructions:   IV sedation. You should not eat for 8 hours and can only drink clear liquids (water or black coffee without cream/sugar) up until 2 hours before your scheduled time.  You CANNOT drive yourself and must have a .     *Refrain from drinking any alcohol  *You may NOT drive yourself home after your procedure.  *Please insure that you have pre-planned your ride home.    *You may not leave alone in an uber, taxi, etc. if you have received sedating medications by mouth or by vein  *You must be discharged to a responsible adult.   *Please remain under adult supervision for 24 hours.   If possible, please have your visitor &/or ride home stay during your visit.   The surgeon should speak with your visitors after your procedure.  All visitors must be 18 years of age or older. Please limit your visitors to max of 2 people.     Day of Procedure  No food, no drink 8 hours prior to your arrival time, or 6 hours if diabetic.      *You should take any medications that you routinely take for blood pressure, heart medications, thyroid, cholesterol, etc with small sips of water.   *If you are a diabetic, do not take your medication if you will be fasting, but bring it with you.   *Please plan on being here for roughly 2 hours.   *Hold vitamins and supplements the morning of your procedure.  *Hold any Anticoagulants (ex. Aspirin,  goody or BC powder, Coumadin, Eliquis, Heparin, Plavix, Lovenox, Xarelto, etc.)  *Hold any non-insulin injections until after procedure (Ozempic, Mounjaro, Trulicity, Victoza, Byetta, Wegovy, Adlyxin, etc) (you should have been instructed to hold for a 7 day period)     Shower the night before and the morning of your procedure with antibacterial soap.   Please do not use antibacterial soap to wash your face. Use your regular face soap.  Do not apply any products to your skin nor your hair after you shower the morning of your procedure.         Products include: lotions, oils, ointments, creams, gels, powders, lotion, deodorant, make-up, perfume/cologne, after shave and sunscreen.     Wear loose, comfortable clothing.  No jewelry. No contacts. Bring glasses if necessary.  If you have dentures, please bring a case.  Please leave all jewelry and valuables at home.     ---If you start to feel sick (fever, chills, coughing, sore throat, etc) or start on or have been taking any antibiotics, please contact your doctor's office at [phone] your procedure would have to be rescheduled.      Thanks,  Catina, LPN [facility]  Pre-Admit Dept

## 2024-02-02 ENCOUNTER — HOSPITAL ENCOUNTER (OUTPATIENT)
Facility: HOSPITAL | Age: 34
Discharge: HOME | End: 2024-02-02
Attending: STUDENT IN AN ORGANIZED HEALTH CARE EDUCATION/TRAINING PROGRAM | Admitting: STUDENT IN AN ORGANIZED HEALTH CARE EDUCATION/TRAINING PROGRAM
Payer: COMMERCIAL

## 2024-02-02 VITALS
HEIGHT: 70 IN | OXYGEN SATURATION: 96 % | DIASTOLIC BLOOD PRESSURE: 97 MMHG | RESPIRATION RATE: 18 BRPM | TEMPERATURE: 98 F | WEIGHT: 315 LBS | SYSTOLIC BLOOD PRESSURE: 160 MMHG | BODY MASS INDEX: 45.1 KG/M2 | HEART RATE: 75 BPM

## 2024-02-02 DIAGNOSIS — G89.29 CHRONIC PAIN: ICD-10-CM

## 2024-02-02 DIAGNOSIS — M54.16 LUMBAR RADICULOPATHY: Primary | ICD-10-CM

## 2024-02-02 PROCEDURE — 25000003 PHARM REV CODE 250: Performed by: STUDENT IN AN ORGANIZED HEALTH CARE EDUCATION/TRAINING PROGRAM

## 2024-02-02 PROCEDURE — 64483 NJX AA&/STRD TFRM EPI L/S 1: CPT | Mod: RT,,, | Performed by: STUDENT IN AN ORGANIZED HEALTH CARE EDUCATION/TRAINING PROGRAM

## 2024-02-02 PROCEDURE — 64483 NJX AA&/STRD TFRM EPI L/S 1: CPT | Mod: RT | Performed by: STUDENT IN AN ORGANIZED HEALTH CARE EDUCATION/TRAINING PROGRAM

## 2024-02-02 PROCEDURE — 25500020 PHARM REV CODE 255: Performed by: STUDENT IN AN ORGANIZED HEALTH CARE EDUCATION/TRAINING PROGRAM

## 2024-02-02 PROCEDURE — 63600175 PHARM REV CODE 636 W HCPCS: Performed by: STUDENT IN AN ORGANIZED HEALTH CARE EDUCATION/TRAINING PROGRAM

## 2024-02-02 RX ORDER — LIDOCAINE HYDROCHLORIDE 10 MG/ML
INJECTION, SOLUTION EPIDURAL; INFILTRATION; INTRACAUDAL; PERINEURAL
Status: DISCONTINUED | OUTPATIENT
Start: 2024-02-02 | End: 2024-02-02 | Stop reason: HOSPADM

## 2024-02-02 RX ORDER — LIDOCAINE HYDROCHLORIDE 20 MG/ML
INJECTION, SOLUTION EPIDURAL; INFILTRATION; INTRACAUDAL; PERINEURAL
Status: DISCONTINUED | OUTPATIENT
Start: 2024-02-02 | End: 2024-02-02 | Stop reason: HOSPADM

## 2024-02-02 RX ORDER — SODIUM CHLORIDE 9 MG/ML
500 INJECTION, SOLUTION INTRAVENOUS CONTINUOUS
Status: DISCONTINUED | OUTPATIENT
Start: 2024-02-02 | End: 2024-02-02 | Stop reason: HOSPADM

## 2024-02-02 RX ORDER — DEXAMETHASONE SODIUM PHOSPHATE 10 MG/ML
INJECTION INTRAMUSCULAR; INTRAVENOUS
Status: DISCONTINUED | OUTPATIENT
Start: 2024-02-02 | End: 2024-02-02 | Stop reason: HOSPADM

## 2024-02-02 RX ORDER — MIDAZOLAM HYDROCHLORIDE 1 MG/ML
INJECTION INTRAMUSCULAR; INTRAVENOUS
Status: DISCONTINUED | OUTPATIENT
Start: 2024-02-02 | End: 2024-02-02 | Stop reason: HOSPADM

## 2024-02-02 RX ORDER — FENTANYL CITRATE 50 UG/ML
INJECTION, SOLUTION INTRAMUSCULAR; INTRAVENOUS
Status: DISCONTINUED | OUTPATIENT
Start: 2024-02-02 | End: 2024-02-02 | Stop reason: HOSPADM

## 2024-02-02 RX ORDER — LIDOCAINE HYDROCHLORIDE 10 MG/ML
1 INJECTION, SOLUTION EPIDURAL; INFILTRATION; INTRACAUDAL; PERINEURAL ONCE
Status: DISCONTINUED | OUTPATIENT
Start: 2024-02-02 | End: 2024-02-02 | Stop reason: HOSPADM

## 2024-02-02 NOTE — OP NOTE
Lumbar Transforaminal Epidural Steroid Injection under Fluoroscopic Guidance    The procedure, risks, benefits, and options were discussed with the patient. There are no contraindications to the procedure. The patent expressed understanding and agreed to the procedure. Informed written consent was obtained prior to the start of the procedure and can be found in the patient's chart.    PATIENT NAME: Perez Calabrese Sr.   MRN: [id number]     DATE OF PROCEDURE: 02/02/2024    PROCEDURE:  Right  L5/S1 Lumbar Transforaminal Epidural Steroid Injection under Fluoroscopic Guidance    PRE-OP DIAGNOSIS: Lumbar radiculopathy [M54.16] Lumbar radiculopathy [M54.16]    POST-OP DIAGNOSIS: Same    PHYSICIAN: Yesenia Harrell MD    ASSISTANTS: None     MEDICATIONS INJECTED: Preservative-free Decadron 10mg with 5cc of Lidocaine 1% MPF     LOCAL ANESTHETIC INJECTED: Xylocaine 2%     SEDATION: Versed 2mg and Fentanyl 100mcg                                                                                                                                                                                     Conscious sedation ordered by M.D. Patient re-evaluation prior to administration of conscious sedation. No changes noted in patient's status from initial evaluation. The patient's vital signs were monitored by RN and patient remained hemodynamically stable throughout the procedure.    Event Time In   Sedation Start 1024   Sedation End 1029       ESTIMATED BLOOD LOSS: None    COMPLICATIONS: None    TECHNIQUE: Time-out was performed to identify the patient and procedure to be performed. With the patient laying in a prone position, the surgical area was prepped and draped in the usual sterile fashion using ChloraPrep and a fenestrated drape.The levels were determined under fluoroscopy guidance. Skin anesthesia was achieved by injecting Lidocaine 2% over the injection sites. The transforaminal spaces were then approached with a 22 gauge, 5 inch spinal  quinke needle that was introduced under fluoroscopic guidance in the AP and Lateral views. Once the needle tip was in the area of the transforaminal space, and there was no blood, CSF or paraesthesias, contrast dye Omnipaque (240mg/mL) was injected to confirm placement and there was no vascular runoff. Fluoroscopic imaging in the AP and lateral views revealed a clear outline of the spinal nerve with proximal spread of agent through the neural foramen into the epidural space. 3 mL of the medication mixture listed above was injected slowly at each site. Displacement of the radio opaque contrast after injection of the medication confirmed that the medication went into the area of the transforaminal spaces. The needles were removed and bleeding was nil. A sterile dressing was applied. No specimens collected. The patient tolerated the procedure well.    The patient was monitored after the procedure in the recovery area. They were given post-procedure and discharge instructions to follow at home. The patient was discharged in a stable condition.    Yesenia Harrell MD

## 2024-02-02 NOTE — PLAN OF CARE
Pt in preop bay 24, VSS, meds given and IV inserted. Pt denies any open wounds on body or the use of any immunizations or antibiotics in the past 2 weeks. Pt needs site marking and consents, otherwise ready to roll.

## 2024-02-02 NOTE — DISCHARGE INSTRUCTIONS
Home Care Instructions Pain Management:     1.  DIET:     You may resume your normal diet today.     2.  BATHING:     You may shower with luke warm water.     3.  DRESSING:     You may remove your bandage today.     4.  ACTIVITY LEVEL:       You may resume your normal activities 24 hours after your procedure.     5.  MEDICATIONS:     You may resume your normal medications today.     6.  SPECIAL INSTRUCTIONS:     No heat to the injection site for 24 hours including bath or shower, heating pad, moist heat or hot tubs.     Use an ice pack to the injection site for any pain or discomfort.  Apply ice packs for 20 minute intervals as needed.     If you have received any sedatives by mouth today, you can not drive for 12 hours.     If you have received sedation through an IV, you can not drive for 24 hours.     PLEASE CALL YOUR DOCTOR FOR THE FOLLOWIN.  Redness or swelling around the injection site.  2.  Fever of 101 degrees.  3.  Drainage (pus) from the injection site.  4.  For any continuous bleeding (some dried blood over the incision is normal.)     FOR EMERGENCIES:     If any unusual problems or difficulties occur during clinic hours, call [phone] or dial [phone].     Follow up with with your physician in 2-3 weeks.

## 2024-02-02 NOTE — INTERVAL H&P NOTE
The patient was examined and no significant changes were noted from the updated H&P or last clinic note.    The risks and benefits of this procedure, including alternative therapies, were discussed with the patient.  The discussion of risks included infection, bleeding, need for additional procedures or surgery, nerve damage, paralysis, adverse medication reaction(s), stroke, and if appropriate for the procedure, death.  Questions regarding the procedure, risks, expected outcome, and possible side effects were solicited and answered to Perez's satisfaction.  Perez Calabrese wishes to proceed with the injection or procedure as confirmed by written consent.

## 2024-02-02 NOTE — DISCHARGE SUMMARY
Discharge Note  Short Stay      SUMMARY     Admit Date: 2/2/2024    Attending Physician: Yesenia Harrell MD PhD    Discharge Physician: Yesenia Harrell      Discharge Date: 2/2/2024 2:12 PM    Procedure(s) (LRB):  RT L5-S1 TFESI (Right)    Final Diagnosis: Lumbar radiculopathy [M54.16]    Disposition: Home or self care    Patient Instructions:   Discharge Medication List as of 2/2/2024 10:40 AM        CONTINUE these medications which have NOT CHANGED    Details   cyclobenzaprine (FLEXERIL) 10 MG tablet Take 10 mg by mouth 3 (three) times daily., Starting Wed 12/20/2023, Historical Med      gabapentin (NEURONTIN) 300 MG capsule Take 1 capsule (300 mg total) by mouth 3 (three) times daily., Starting Mon 1/22/2024, Until Tue 1/21/2025, Normal      !! meloxicam (MOBIC) 15 MG tablet Take 1 tablet (15 mg total) by mouth daily as needed for Pain., Starting Wed 6/2/2021, Print      !! meloxicam (MOBIC) 15 MG tablet Take 15 mg by mouth daily as needed for Pain., Starting Wed 12/20/2023, Historical Med      methylPREDNISolone (MEDROL DOSEPACK) 4 mg tablet use as directed x 6 days. Do not take mobic/meloxicam with this medication., Normal       !! - Potential duplicate medications found. Please discuss with provider.              Discharge Diagnosis: Lumbar radiculopathy [M54.16]  Condition on Discharge: Stable with no complications to procedure   Diet on Discharge: Same as before.  Activity: as per instruction sheet.  Discharge to: Home with a responsible adult.  Follow up: 2-4 weeks       Please call my office or pager at [phone] if experienced any weakness or loss of sensation, fever > 101.5, pain uncontrolled with oral medications, persistent nausea/vomiting/or diarrhea, redness or drainage from the incisions, or any other worrisome concerns. If physician on call was not reached or could not communicate with our office for any reason please go to the nearest emergency department      Yesenia Harrell MD PhD

## 2024-02-02 NOTE — PLAN OF CARE
Discharge instructions given to patient,  verbalized understanding of all.  VSS, denies n/v and tolerating PO, rates pain level tolerable. IV removed, and family notified for patient discharge home.

## 2024-02-16 ENCOUNTER — LAB VISIT (OUTPATIENT)
Dept: LAB | Facility: HOSPITAL | Age: 34
End: 2024-02-16
Payer: COMMERCIAL

## 2024-02-16 ENCOUNTER — OFFICE VISIT (OUTPATIENT)
Dept: INTERNAL MEDICINE | Facility: CLINIC | Age: 34
End: 2024-02-16
Payer: COMMERCIAL

## 2024-02-16 VITALS
DIASTOLIC BLOOD PRESSURE: 92 MMHG | OXYGEN SATURATION: 98 % | WEIGHT: 313.25 LBS | HEIGHT: 70 IN | HEART RATE: 80 BPM | SYSTOLIC BLOOD PRESSURE: 154 MMHG | BODY MASS INDEX: 44.85 KG/M2

## 2024-02-16 DIAGNOSIS — R73.9 HYPERGLYCEMIA: ICD-10-CM

## 2024-02-16 DIAGNOSIS — E66.01 CLASS 3 SEVERE OBESITY WITH BODY MASS INDEX (BMI) OF 40.0 TO 44.9 IN ADULT, UNSPECIFIED OBESITY TYPE, UNSPECIFIED WHETHER SERIOUS COMORBIDITY PRESENT: ICD-10-CM

## 2024-02-16 DIAGNOSIS — I10 PRIMARY HYPERTENSION: Primary | ICD-10-CM

## 2024-02-16 LAB
ESTIMATED AVG GLUCOSE: 134 MG/DL (ref 68–131)
HBA1C MFR BLD: 6.3 % (ref 4–5.6)

## 2024-02-16 PROCEDURE — 3077F SYST BP >= 140 MM HG: CPT | Mod: CPTII,S$GLB,, | Performed by: FAMILY MEDICINE

## 2024-02-16 PROCEDURE — 1160F RVW MEDS BY RX/DR IN RCRD: CPT | Mod: CPTII,S$GLB,, | Performed by: FAMILY MEDICINE

## 2024-02-16 PROCEDURE — 99999 PR PBB SHADOW E&M-EST. PATIENT-LVL IV: CPT | Mod: PBBFAC,,, | Performed by: FAMILY MEDICINE

## 2024-02-16 PROCEDURE — 81003 URINALYSIS AUTO W/O SCOPE: CPT | Performed by: FAMILY MEDICINE

## 2024-02-16 PROCEDURE — 36415 COLL VENOUS BLD VENIPUNCTURE: CPT | Performed by: FAMILY MEDICINE

## 2024-02-16 PROCEDURE — 3044F HG A1C LEVEL LT 7.0%: CPT | Mod: CPTII,S$GLB,, | Performed by: FAMILY MEDICINE

## 2024-02-16 PROCEDURE — 3080F DIAST BP >= 90 MM HG: CPT | Mod: CPTII,S$GLB,, | Performed by: FAMILY MEDICINE

## 2024-02-16 PROCEDURE — 3008F BODY MASS INDEX DOCD: CPT | Mod: CPTII,S$GLB,, | Performed by: FAMILY MEDICINE

## 2024-02-16 PROCEDURE — 83036 HEMOGLOBIN GLYCOSYLATED A1C: CPT | Performed by: FAMILY MEDICINE

## 2024-02-16 PROCEDURE — 99213 OFFICE O/P EST LOW 20 MIN: CPT | Mod: S$GLB,,, | Performed by: FAMILY MEDICINE

## 2024-02-16 PROCEDURE — 1159F MED LIST DOCD IN RCRD: CPT | Mod: CPTII,S$GLB,, | Performed by: FAMILY MEDICINE

## 2024-02-16 RX ORDER — AMLODIPINE BESYLATE 10 MG/1
10 TABLET ORAL DAILY
Qty: 90 TABLET | Refills: 3 | Status: SHIPPED | OUTPATIENT
Start: 2024-02-16 | End: 2025-02-15

## 2024-02-16 NOTE — PROGRESS NOTES
"Subjective:     Patient ID: Perez Calabrese Sr. is a 33 y.o. male.   Chief Complaint: Follow-up    HPI:  Had DOT physical. Noted to be hypertensive. UA showed hematuria and glucosuria. Fingerstick BG was 177. Was advised he needs additional work-up for both issues.    Also was advised he needs sleep study for BMI + comorbidities - HTN, T2DM, hypothyroidism. Review of chart shows patient was euthyroid on last check. A1c was right at 6.5% without recent confirmation.    Review of Systems   Constitutional:  Negative for activity change and unexpected weight change.   HENT:  Negative for hearing loss, rhinorrhea and trouble swallowing.    Eyes:  Negative for discharge and visual disturbance.   Respiratory:  Negative for chest tightness and wheezing.    Cardiovascular:  Negative for chest pain and palpitations.   Gastrointestinal:  Negative for blood in stool, constipation, diarrhea and vomiting.   Endocrine: Negative for polydipsia and polyuria.   Genitourinary:  Negative for difficulty urinating, hematuria and urgency.   Musculoskeletal:  Negative for arthralgias, joint swelling and neck pain.   Neurological:  Negative for weakness and headaches.   Psychiatric/Behavioral:  Negative for confusion and dysphoric mood.           Objective:      Vitals:    02/16/24 1427 02/16/24 1714   BP:  (!) 154/92   BP Location:  Right arm   Patient Position:  Sitting   BP Method:  Large (Manual)   Pulse: 80    SpO2: 98%    Weight: (!) 142.1 kg (313 lb 4.4 oz)    Height: 5' 10" (1.778 m)       Physical Exam  Constitutional:       General: He is not in acute distress.     Appearance: Normal appearance. He is obese. He is not ill-appearing.   HENT:      Head: Normocephalic and atraumatic.      Right Ear: External ear normal.      Left Ear: External ear normal.      Nose: Nose normal.      Mouth/Throat:      Mouth: Mucous membranes are moist.      Pharynx: Oropharynx is clear. No oropharyngeal exudate or posterior oropharyngeal erythema. "   Cardiovascular:      Rate and Rhythm: Normal rate. Rhythm irregular.      Pulses: Normal pulses.      Heart sounds: Normal heart sounds. No murmur heard.     No friction rub. No gallop.   Pulmonary:      Effort: Pulmonary effort is normal. No respiratory distress.      Breath sounds: Normal breath sounds. No stridor. No wheezing, rhonchi or rales.   Musculoskeletal:         General: Normal range of motion.      Cervical back: Normal range of motion.   Lymphadenopathy:      Cervical: No cervical adenopathy.   Skin:     General: Skin is warm and dry.   Neurological:      General: No focal deficit present.      Mental Status: He is oriented to person, place, and time. Mental status is at baseline.   Psychiatric:         Mood and Affect: Mood normal.         Behavior: Behavior normal.         Thought Content: Thought content normal.         Judgment: Judgment normal.           Assessment:       Problem List Items Addressed This Visit    None  Visit Diagnoses       Primary hypertension    -  Primary    Relevant Medications    amLODIPine (NORVASC) 10 MG tablet    Hyperglycemia        Relevant Orders    Hemoglobin A1C    Urinalysis    Class 3 severe obesity with body mass index (BMI) of 40.0 to 44.9 in adult, unspecified obesity type, unspecified whether serious comorbidity present        Relevant Orders    Ambulatory referral/consult to Sleep Disorders              Plan:       1. 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  Dispense: 90 tablet; Refill: 3    2. Hyperglycemia  Noted 6.5% on last A1c, repeat today to confirm diabetic status  Reorder UA to look for hematuria and glucosuria  Form to be completed upon receipt  of results  - Hemoglobin A1C; Future  - Urinalysis; Future    3. Class 3 severe obesity with body mass index (BMI) of 40.0 to 44.9 in adult, unspecified obesity type, unspecified whether serious comorbidity present  Ref to sleep medicine for PSG  Will need form completed through sleep med for risk and CPAP compliance (if needed)  - Ambulatory referral/consult to Sleep Disorders; Future          Follow up in about 2 weeks (around 3/1/2024) for Nurse visit BP check - will need serial visits for trending.     Suhas Paige MD, FAAFP  Family Medicine Physician  [facility]  02/16/2024

## 2024-02-17 LAB
BILIRUB UR QL STRIP: NEGATIVE
CLARITY UR REFRACT.AUTO: CLEAR
COLOR UR AUTO: YELLOW
GLUCOSE UR QL STRIP: NEGATIVE
HGB UR QL STRIP: NEGATIVE
KETONES UR QL STRIP: NEGATIVE
LEUKOCYTE ESTERASE UR QL STRIP: NEGATIVE
NITRITE UR QL STRIP: NEGATIVE
PH UR STRIP: 7 [PH] (ref 5–8)
PROT UR QL STRIP: ABNORMAL
SP GR UR STRIP: 1.02 (ref 1–1.03)
URN SPEC COLLECT METH UR: ABNORMAL

## 2024-02-28 ENCOUNTER — PATIENT MESSAGE (OUTPATIENT)
Dept: INTERNAL MEDICINE | Facility: CLINIC | Age: 34
End: 2024-02-28
Payer: COMMERCIAL

## 2024-03-01 ENCOUNTER — CLINICAL SUPPORT (OUTPATIENT)
Dept: INTERNAL MEDICINE | Facility: CLINIC | Age: 34
End: 2024-03-01
Payer: COMMERCIAL

## 2024-03-01 DIAGNOSIS — I10 PRIMARY HYPERTENSION: Primary | ICD-10-CM

## 2024-03-01 NOTE — PROGRESS NOTES
Pt arrived for BP check today, /80  Started amlodipine 10mg 2 weeks ago  He takes his medication every morning and did take his amlodipine this AM  Pt states he is exercising 2 times per week and eating more fruits and veggies  Spoke with pt about cutting down on processed foods.   His girlfriend, a nurse, checks his BP daily  Second BP after 15 minutes 134/80

## 2024-03-04 ENCOUNTER — PATIENT MESSAGE (OUTPATIENT)
Dept: INTERNAL MEDICINE | Facility: CLINIC | Age: 34
End: 2024-03-04
Payer: COMMERCIAL

## 2024-03-16 ENCOUNTER — PATIENT MESSAGE (OUTPATIENT)
Dept: INTERNAL MEDICINE | Facility: CLINIC | Age: 34
End: 2024-03-16
Payer: COMMERCIAL

## 2024-03-19 ENCOUNTER — OFFICE VISIT (OUTPATIENT)
Dept: SLEEP MEDICINE | Facility: CLINIC | Age: 34
End: 2024-03-19
Payer: COMMERCIAL

## 2024-03-19 VITALS — BODY MASS INDEX: 45.1 KG/M2 | WEIGHT: 315 LBS | HEIGHT: 70 IN

## 2024-03-19 DIAGNOSIS — Z91.89 AT RISK FOR OBSTRUCTIVE SLEEP APNEA: Primary | ICD-10-CM

## 2024-03-19 DIAGNOSIS — I10 HYPERTENSION, UNSPECIFIED TYPE: ICD-10-CM

## 2024-03-19 DIAGNOSIS — R35.1 NOCTURIA: ICD-10-CM

## 2024-03-19 DIAGNOSIS — E66.01 CLASS 3 SEVERE OBESITY WITH BODY MASS INDEX (BMI) OF 40.0 TO 44.9 IN ADULT, UNSPECIFIED OBESITY TYPE, UNSPECIFIED WHETHER SERIOUS COMORBIDITY PRESENT: ICD-10-CM

## 2024-03-19 DIAGNOSIS — Z82.0 FAMILY HISTORY OF SLEEP APNEA: ICD-10-CM

## 2024-03-19 DIAGNOSIS — F51.09 OTHER INSOMNIA NOT DUE TO A SUBSTANCE OR KNOWN PHYSIOLOGICAL CONDITION: ICD-10-CM

## 2024-03-19 PROCEDURE — 3008F BODY MASS INDEX DOCD: CPT | Mod: CPTII,S$GLB,, | Performed by: PHYSICIAN ASSISTANT

## 2024-03-19 PROCEDURE — 99999 PR PBB SHADOW E&M-EST. PATIENT-LVL III: CPT | Mod: PBBFAC,,, | Performed by: PHYSICIAN ASSISTANT

## 2024-03-19 PROCEDURE — 1159F MED LIST DOCD IN RCRD: CPT | Mod: CPTII,S$GLB,, | Performed by: PHYSICIAN ASSISTANT

## 2024-03-19 PROCEDURE — 99204 OFFICE O/P NEW MOD 45 MIN: CPT | Mod: S$GLB,,, | Performed by: PHYSICIAN ASSISTANT

## 2024-03-19 PROCEDURE — 3044F HG A1C LEVEL LT 7.0%: CPT | Mod: CPTII,S$GLB,, | Performed by: PHYSICIAN ASSISTANT

## 2024-03-19 PROCEDURE — 1160F RVW MEDS BY RX/DR IN RCRD: CPT | Mod: CPTII,S$GLB,, | Performed by: PHYSICIAN ASSISTANT

## 2024-03-19 NOTE — PROGRESS NOTES
Referred by Suhas Paige MD     NEW PATIENT VISIT    Perez Calabrese Sr.  is a pleasant 33 y.o. 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 Was advised he would need sleep study in evaluation for TREY, which is why he presents today. Denies snoring or witnessed apneas. Denies excessive daytime sleepiness, but does report occasional afternoon nap. Does endorse family history of TREY (mother)    STOP BANG:  Snoring? Denies  Tired? Denies  Observed apneas? Denies  Pressure (dx of HTN)? Yes     BMI >35? Yes  Age older than 50? No  Neck Size >40cm/17in? Yes  Gender=Male? Yes     TREY - Intermediate Risk : Yes to 3 - 4 questions    SLEEP SCHEDULE   Environment    Bed Time 9-10PM   Sleep Latency 30-60mins   Arousals 1   Nocturia 1   Back to sleep 10mins   Wake time 7-8AM   Naps Occasional 30min afternoon nap   Work          Past Medical History:   Diagnosis Date    Allergy     Phimosis      There is no problem list on file for this patient.      Current Outpatient Medications:     amLODIPine (NORVASC) 10 MG tablet, Take 1 tablet (10 mg total) by mouth once daily., Disp: 90 tablet, Rfl: 3    cyclobenzaprine (FLEXERIL) 10 MG tablet, Take 10 mg by mouth 3 (three) times daily., Disp: , Rfl:     meloxicam (MOBIC) 15 MG tablet, Take 15 mg by mouth daily as needed for Pain., Disp: , Rfl:     gabapentin (NEURONTIN) 300 MG capsule, Take 1 capsule (300 mg total) by mouth 3 (three) times daily. (Patient not taking: Reported on 2/16/2024), Disp: 90 capsule, Rfl: 11    meloxicam (MOBIC) 15 MG tablet, Take 1 tablet (15 mg total) by mouth daily as needed for Pain. (Patient not taking: Reported on 2/16/2024), Disp: 30 tablet, Rfl: 5    methylPREDNISolone (MEDROL DOSEPACK) 4 mg tablet, use as directed x 6 days. Do not take mobic/meloxicam with this medication. (Patient not taking: Reported on 2/16/2024), Disp: 1 Package, Rfl: 0  "      Vitals:    24 1130   Weight: (!) 146.7 kg (323 lb 6.6 oz)   Height: 5' 10" (1.778 m)     Physical Exam:    GEN:   Well-appearing  Psych:  Appropriate affect, demonstrates insight  SKIN:  No rash on the face or bridge of the nose      LABS:   Lab Results   Component Value Date    HGB 16.3 2024    CO2 22 (L) 2024         RECORDS REVIEWED:    No previous sleep study    ASSESSMENT    [place] Sleepiness Scale:  Sitting and readin  Watching TV:    1  Passenger in a car x 1 hr:  0  Sitting quietly after lunch:  0  Lying down to rest in PM:  3  Sitting, inactive in public:  0  Sitting+ talking to someone:  0  Stopped in traffic:   0  Total        PROBLEM DESCRIPTION/ Sx on Presentation  STATUS   sx TREY   denies snoring, denies witnessed apneas  Mother has TREY on CPAP   New   Daytime Sx   + occasional sleepiness when inactive   Denies accidental dozing or drowsiness when driving  Reports will occasionally take afternoon nap if he has the opportunity   ESS  on intake  New   Insomnia   Trouble falling asleep: 30-60mins  Arousals:         for nocturia  Hard to get back to sleep?: not usually (10mins)    Prior pertinent medications:  Current pertinent medications:   New   Nocturia   x 1 per sleep period  New   Other issues:       PLAN      -recommend sleep testing   -HST ordered  -discussed trial therapy if TREY present and the patient is open to a trial of CPAP therapy  -discussed TREY and PAP with patient in detail, including possible complications of untreated TREY like heart attack/stroke  -advised on strict driving precautions; advised never to drive drowsy     Advised on plan of care. Answered all patient questions. Patient verbalized understanding and voiced agreement with plan of care.     RTC if dx of TREY made and CPAP ordered, will need follow up 31-90 days after receiving machine for compliance         The patient was given open opportunity to ask questions and/or express concerns " about treatment plan.   All questions/concerns were discussed.     Two patient identifiers used prior to evaluation.

## 2024-03-20 ENCOUNTER — TELEPHONE (OUTPATIENT)
Dept: SLEEP MEDICINE | Facility: OTHER | Age: 34
End: 2024-03-20
Payer: COMMERCIAL

## 2024-03-21 NOTE — TELEPHONE ENCOUNTER
Called pt to schedule, no answer. Left message. Will send portal message.   
Please assist in setting up an appointment for this patient. Thanks.
normal...

## 2024-03-22 ENCOUNTER — HOSPITAL ENCOUNTER (OUTPATIENT)
Dept: SLEEP MEDICINE | Facility: OTHER | Age: 34
Discharge: HOME | End: 2024-03-22
Attending: PHYSICIAN ASSISTANT
Payer: COMMERCIAL

## 2024-03-22 DIAGNOSIS — R35.1 NOCTURIA: ICD-10-CM

## 2024-03-22 DIAGNOSIS — Z82.0 FAMILY HISTORY OF SLEEP APNEA: ICD-10-CM

## 2024-03-22 DIAGNOSIS — E66.01 CLASS 3 SEVERE OBESITY WITH BODY MASS INDEX (BMI) OF 40.0 TO 44.9 IN ADULT, UNSPECIFIED OBESITY TYPE, UNSPECIFIED WHETHER SERIOUS COMORBIDITY PRESENT: ICD-10-CM

## 2024-03-22 DIAGNOSIS — Z91.89 AT RISK FOR OBSTRUCTIVE SLEEP APNEA: ICD-10-CM

## 2024-03-22 DIAGNOSIS — F51.09 OTHER INSOMNIA NOT DUE TO A SUBSTANCE OR KNOWN PHYSIOLOGICAL CONDITION: ICD-10-CM

## 2024-03-22 DIAGNOSIS — I10 HYPERTENSION, UNSPECIFIED TYPE: ICD-10-CM

## 2024-03-22 PROCEDURE — 95800 SLP STDY UNATTENDED: CPT

## 2024-03-25 ENCOUNTER — OFFICE VISIT (OUTPATIENT)
Dept: INTERNAL MEDICINE | Facility: CLINIC | Age: 34
End: 2024-03-25
Payer: COMMERCIAL

## 2024-03-25 VITALS
DIASTOLIC BLOOD PRESSURE: 98 MMHG | WEIGHT: 315 LBS | OXYGEN SATURATION: 98 % | SYSTOLIC BLOOD PRESSURE: 160 MMHG | HEART RATE: 80 BPM | HEIGHT: 70 IN | BODY MASS INDEX: 45.1 KG/M2

## 2024-03-25 DIAGNOSIS — I10 PRIMARY HYPERTENSION: Primary | ICD-10-CM

## 2024-03-25 PROBLEM — E66.01 CLASS 3 SEVERE OBESITY WITH BODY MASS INDEX (BMI) OF 40.0 TO 44.9 IN ADULT: Status: ACTIVE | Noted: 2024-03-25

## 2024-03-25 PROBLEM — E66.813 CLASS 3 SEVERE OBESITY WITH BODY MASS INDEX (BMI) OF 40.0 TO 44.9 IN ADULT: Status: ACTIVE | Noted: 2024-03-25

## 2024-03-25 PROCEDURE — 3077F SYST BP >= 140 MM HG: CPT | Mod: CPTII,S$GLB,, | Performed by: FAMILY MEDICINE

## 2024-03-25 PROCEDURE — 3044F HG A1C LEVEL LT 7.0%: CPT | Mod: CPTII,S$GLB,, | Performed by: FAMILY MEDICINE

## 2024-03-25 PROCEDURE — 3008F BODY MASS INDEX DOCD: CPT | Mod: CPTII,S$GLB,, | Performed by: FAMILY MEDICINE

## 2024-03-25 PROCEDURE — 1159F MED LIST DOCD IN RCRD: CPT | Mod: CPTII,S$GLB,, | Performed by: FAMILY MEDICINE

## 2024-03-25 PROCEDURE — 99213 OFFICE O/P EST LOW 20 MIN: CPT | Mod: S$GLB,,, | Performed by: FAMILY MEDICINE

## 2024-03-25 PROCEDURE — 3080F DIAST BP >= 90 MM HG: CPT | Mod: CPTII,S$GLB,, | Performed by: FAMILY MEDICINE

## 2024-03-25 PROCEDURE — 99999 PR PBB SHADOW E&M-EST. PATIENT-LVL III: CPT | Mod: PBBFAC,,, | Performed by: FAMILY MEDICINE

## 2024-03-25 RX ORDER — LISINOPRIL 20 MG/1
20 TABLET ORAL DAILY
Qty: 90 TABLET | Refills: 3 | Status: SHIPPED | OUTPATIENT
Start: 2024-03-25 | End: 2025-03-25

## 2024-03-25 NOTE — PROGRESS NOTES
"Subjective:     Patient ID: Perez Calabrese Sr. is a 33 y.o. male.   Chief Complaint: Hypertension    HPI:  Presenting for HTN f/u.    Tolerating amlodipine, but it is not effective in managing his BP.    Denies HA, vision changes, CP, SOB. He has been working out more in attempts to lose weight.     Review of Systems   Constitutional:  Negative for activity change, chills and fever.   Respiratory:  Negative for chest tightness and shortness of breath.    Cardiovascular:  Negative for chest pain and palpitations.   Neurological:  Negative for dizziness, vertigo, syncope, light-headedness and headaches.          Objective:      Vitals:    03/25/24 1420   BP: (!) 160/98   BP Location: Right arm   Patient Position: Sitting   BP Method: Large (Manual)   Pulse: 80   SpO2: 98%   Weight: (!) 146.7 kg (323 lb 6.6 oz)   Height: 5' 10" (1.778 m)      Physical Exam  Vitals reviewed.   Constitutional:       General: He is not in acute distress.     Appearance: Normal appearance. He is obese. He is not ill-appearing.   HENT:      Head: Normocephalic and atraumatic.   Cardiovascular:      Rate and Rhythm: Normal rate and regular rhythm.      Pulses: Normal pulses.      Heart sounds: Normal heart sounds. No murmur heard.     No friction rub. No gallop.   Pulmonary:      Effort: Pulmonary effort is normal. No respiratory distress.      Breath sounds: Normal breath sounds. No stridor. No wheezing, rhonchi or rales.   Neurological:      General: No focal deficit present.      Mental Status: He is alert and oriented to person, place, and time. Mental status is at baseline.   Psychiatric:         Mood and Affect: Mood normal.         Behavior: Behavior normal.         Thought Content: Thought content normal.         Judgment: Judgment normal.           Assessment:       Problem List Items Addressed This Visit    None  Visit Diagnoses       Primary hypertension    -  Primary    Relevant Medications    lisinopriL (PRINIVIL,ZESTRIL) 20 MG " tablet              Plan:       1. Primary hypertension  Poorly controlled  Continue amlodipine 10mg, add lisinopril 20mg  Advised of potential side effects  Midrange dose, will likely need further titration  Advised to check BP daily as he has been doing. Pt will report back results at the end of next week for further management  - lisinopriL (PRINIVIL,ZESTRIL) 20 MG tablet; Take 1 tablet (20 mg total) by mouth once daily.  Dispense: 90 tablet; Refill: 3          Follow up in about 2 weeks (around 4/8/2024).     Suhas Paige MD, FAAFP  Family Medicine Physician  [facility]  03/25/2024

## 2024-03-26 ENCOUNTER — PATIENT MESSAGE (OUTPATIENT)
Dept: SLEEP MEDICINE | Facility: CLINIC | Age: 34
End: 2024-03-26

## 2024-03-26 PROCEDURE — 95806 SLEEP STUDY UNATT&RESP EFFT: CPT | Mod: 26,,, | Performed by: INTERNAL MEDICINE

## 2024-03-28 ENCOUNTER — PATIENT MESSAGE (OUTPATIENT)
Dept: SLEEP MEDICINE | Facility: CLINIC | Age: 34
End: 2024-03-28
Payer: COMMERCIAL

## 2024-03-28 DIAGNOSIS — G47.33 OSA (OBSTRUCTIVE SLEEP APNEA): Primary | ICD-10-CM

## 2024-04-04 ENCOUNTER — PATIENT MESSAGE (OUTPATIENT)
Dept: SLEEP MEDICINE | Facility: CLINIC | Age: 34
End: 2024-04-04
Payer: COMMERCIAL

## 2024-04-22 ENCOUNTER — PATIENT MESSAGE (OUTPATIENT)
Dept: INTERNAL MEDICINE | Facility: CLINIC | Age: 34
End: 2024-04-22
Payer: COMMERCIAL

## 2024-05-20 ENCOUNTER — PATIENT MESSAGE (OUTPATIENT)
Dept: SLEEP MEDICINE | Facility: CLINIC | Age: 34
End: 2024-05-20
Payer: COMMERCIAL

## 2024-06-10 ENCOUNTER — PATIENT MESSAGE (OUTPATIENT)
Dept: INTERNAL MEDICINE | Facility: CLINIC | Age: 34
End: 2024-06-10
Payer: COMMERCIAL

## 2024-06-24 DIAGNOSIS — I10 PRIMARY HYPERTENSION: ICD-10-CM

## 2024-06-24 RX ORDER — AMLODIPINE BESYLATE 10 MG/1
10 TABLET ORAL DAILY
Qty: 90 TABLET | Refills: 2 | Status: SHIPPED | OUTPATIENT
Start: 2024-06-24

## 2024-06-24 RX ORDER — LISINOPRIL 20 MG/1
20 TABLET ORAL DAILY
Qty: 90 TABLET | Refills: 1 | Status: SHIPPED | OUTPATIENT
Start: 2024-06-24

## 2024-06-24 NOTE — TELEPHONE ENCOUNTER
Refill Routing Note   Medication(s) are not appropriate for processing by [facility] for the following reason(s):        Required vitals abnormal: BP (!) 160/98     [facility] action(s):  Defer      Medication Therapy Plan:         Appointments  past 12m or future 3m with PCP    Date Provider   Last Visit   3/25/2024 Suhas Paige MD   Next Visit   Visit date not found Suhas Paige MD   ED visits in past 90 days: 0        Note composed:2:15 PM 06/24/2024

## 2024-06-24 NOTE — TELEPHONE ENCOUNTER
No care due was identified.  [facility] Embedded Care Due Messages. Reference number: [id number].   6/24/2024 12:01:28 PM CDT

## 2024-10-16 ENCOUNTER — PATIENT MESSAGE (OUTPATIENT)
Dept: ADMINISTRATIVE | Facility: HOSPITAL | Age: 34
End: 2024-10-16
Payer: COMMERCIAL

## 2024-11-20 ENCOUNTER — TELEPHONE (OUTPATIENT)
Dept: PAIN MEDICINE | Facility: CLINIC | Age: 34
End: 2024-11-20
Payer: COMMERCIAL

## 2024-11-20 NOTE — TELEPHONE ENCOUNTER
----- Message from Kristin sent at 11/20/2024 11:24 AM CST -----  .Type: Patient Call Back    Who called:[employer]    What is the request in detail: Requesting Medical Records for 2/2/24 visit for an DARRELL Injection. Ask that the nurse give her a call    Can the clinic reply by [facility]? No     Would the patient rather a call back or a response via [facility]? Call Back     Best call back number: [phone]    Additional Information:

## 2024-11-27 ENCOUNTER — TELEPHONE (OUTPATIENT)
Dept: PAIN MEDICINE | Facility: CLINIC | Age: 34
End: 2024-11-27
Payer: COMMERCIAL

## 2024-11-27 NOTE — TELEPHONE ENCOUNTER
Staff sent message to [facility] staff. A medical assistant was in process of helping him out.

## 2024-11-27 NOTE — TELEPHONE ENCOUNTER
Staff returned [employer] call to inform Clover that we are not able to give out medical records. Clover stated that she already spoke with someone at [facility] that was able to help her out but the information that was provided to her was only partial information. Staff informed Clover that we will forward message over to [facility] staff to further assist

## 2025-01-16 DIAGNOSIS — I10 PRIMARY HYPERTENSION: ICD-10-CM

## 2025-04-03 ENCOUNTER — PATIENT MESSAGE (OUTPATIENT)
Dept: INTERNAL MEDICINE | Facility: CLINIC | Age: 35
End: 2025-04-03

## 2025-05-23 ENCOUNTER — PATIENT MESSAGE (OUTPATIENT)
Dept: SLEEP MEDICINE | Facility: CLINIC | Age: 35
End: 2025-05-23

## 2025-06-03 ENCOUNTER — OFFICE VISIT (OUTPATIENT)
Dept: INTERNAL MEDICINE | Facility: CLINIC | Age: 35
End: 2025-06-03
Payer: COMMERCIAL

## 2025-06-03 ENCOUNTER — LAB VISIT (OUTPATIENT)
Dept: LAB | Facility: HOSPITAL | Age: 35
End: 2025-06-03
Attending: FAMILY MEDICINE
Payer: COMMERCIAL

## 2025-06-03 VITALS
SYSTOLIC BLOOD PRESSURE: 136 MMHG | HEIGHT: 70 IN | WEIGHT: 297.63 LBS | HEART RATE: 78 BPM | DIASTOLIC BLOOD PRESSURE: 88 MMHG | BODY MASS INDEX: 42.61 KG/M2 | OXYGEN SATURATION: 97 %

## 2025-06-03 DIAGNOSIS — E66.813 CLASS 3 SEVERE OBESITY DUE TO EXCESS CALORIES WITH SERIOUS COMORBIDITY AND BODY MASS INDEX (BMI) OF 40.0 TO 44.9 IN ADULT: ICD-10-CM

## 2025-06-03 DIAGNOSIS — Z00.00 ENCOUNTER FOR ANNUAL GENERAL MEDICAL EXAMINATION WITHOUT ABNORMAL FINDINGS IN ADULT: Primary | ICD-10-CM

## 2025-06-03 DIAGNOSIS — Z00.00 ENCOUNTER FOR ANNUAL GENERAL MEDICAL EXAMINATION WITHOUT ABNORMAL FINDINGS IN ADULT: ICD-10-CM

## 2025-06-03 DIAGNOSIS — E66.01 CLASS 3 SEVERE OBESITY DUE TO EXCESS CALORIES WITH SERIOUS COMORBIDITY AND BODY MASS INDEX (BMI) OF 40.0 TO 44.9 IN ADULT: ICD-10-CM

## 2025-06-03 DIAGNOSIS — I10 PRIMARY HYPERTENSION: ICD-10-CM

## 2025-06-03 DIAGNOSIS — Z13.31 POSITIVE DEPRESSION SCREENING: ICD-10-CM

## 2025-06-03 LAB
ABSOLUTE EOSINOPHIL (OHS): 0.05 K/UL
ABSOLUTE MONOCYTE (OHS): 0.67 K/UL (ref 0.3–1)
ABSOLUTE NEUTROPHIL COUNT (OHS): 4.64 K/UL (ref 1.8–7.7)
ALBUMIN SERPL BCP-MCNC: 4.7 G/DL (ref 3.5–5.2)
ALP SERPL-CCNC: 63 UNIT/L (ref 40–150)
ALT SERPL W/O P-5'-P-CCNC: 35 UNIT/L (ref 10–44)
ANION GAP (OHS): 9 MMOL/L (ref 8–16)
AST SERPL-CCNC: 25 UNIT/L (ref 11–45)
BASOPHILS # BLD AUTO: 0.03 K/UL
BASOPHILS NFR BLD AUTO: 0.4 %
BILIRUB SERPL-MCNC: 0.5 MG/DL (ref 0.1–1)
BUN SERPL-MCNC: 9 MG/DL (ref 6–20)
CALCIUM SERPL-MCNC: 9.6 MG/DL (ref 8.7–10.5)
CHLORIDE SERPL-SCNC: 105 MMOL/L (ref 95–110)
CHOLEST SERPL-MCNC: 150 MG/DL (ref 120–199)
CHOLEST/HDLC SERPL: 4.8 {RATIO} (ref 2–5)
CO2 SERPL-SCNC: 25 MMOL/L (ref 23–29)
CREAT SERPL-MCNC: 1.1 MG/DL (ref 0.5–1.4)
EAG (OHS): 126 MG/DL (ref 68–131)
ERYTHROCYTE [DISTWIDTH] IN BLOOD BY AUTOMATED COUNT: 13.6 % (ref 11.5–14.5)
GFR SERPLBLD CREATININE-BSD FMLA CKD-EPI: >60 ML/MIN/1.73/M2
GLUCOSE SERPL-MCNC: 87 MG/DL (ref 70–110)
HBA1C MFR BLD: 6 % (ref 4–5.6)
HCT VFR BLD AUTO: 50.9 % (ref 40–54)
HDLC SERPL-MCNC: 31 MG/DL (ref 40–75)
HDLC SERPL: 20.7 % (ref 20–50)
HGB BLD-MCNC: 16.1 GM/DL (ref 14–18)
IMM GRANULOCYTES # BLD AUTO: 0.02 K/UL (ref 0–0.04)
IMM GRANULOCYTES NFR BLD AUTO: 0.3 % (ref 0–0.5)
LDLC SERPL CALC-MCNC: 89.8 MG/DL (ref 63–159)
LYMPHOCYTES # BLD AUTO: 2.43 K/UL (ref 1–4.8)
MCH RBC QN AUTO: 28.4 PG (ref 27–31)
MCHC RBC AUTO-ENTMCNC: 31.6 G/DL (ref 32–36)
MCV RBC AUTO: 90 FL (ref 82–98)
NONHDLC SERPL-MCNC: 119 MG/DL
NUCLEATED RBC (/100WBC) (OHS): 0 /100 WBC
PLATELET # BLD AUTO: 261 K/UL (ref 150–450)
PMV BLD AUTO: 10.7 FL (ref 9.2–12.9)
POTASSIUM SERPL-SCNC: 3.9 MMOL/L (ref 3.5–5.1)
PROT SERPL-MCNC: 7.8 GM/DL (ref 6–8.4)
RBC # BLD AUTO: 5.67 M/UL (ref 4.6–6.2)
RELATIVE EOSINOPHIL (OHS): 0.6 %
RELATIVE LYMPHOCYTE (OHS): 31 % (ref 18–48)
RELATIVE MONOCYTE (OHS): 8.5 % (ref 4–15)
RELATIVE NEUTROPHIL (OHS): 59.2 % (ref 38–73)
SODIUM SERPL-SCNC: 139 MMOL/L (ref 136–145)
T4 FREE SERPL-MCNC: 1.22 NG/DL (ref 0.71–1.51)
TRIGL SERPL-MCNC: 146 MG/DL (ref 30–150)
TSH SERPL-ACNC: 0.41 UIU/ML (ref 0.4–4)
WBC # BLD AUTO: 7.84 K/UL (ref 3.9–12.7)

## 2025-06-03 PROCEDURE — 3079F DIAST BP 80-89 MM HG: CPT | Mod: CPTII,S$GLB,, | Performed by: FAMILY MEDICINE

## 2025-06-03 PROCEDURE — 1159F MED LIST DOCD IN RCRD: CPT | Mod: CPTII,S$GLB,, | Performed by: FAMILY MEDICINE

## 2025-06-03 PROCEDURE — 99214 OFFICE O/P EST MOD 30 MIN: CPT | Mod: S$GLB,,, | Performed by: FAMILY MEDICINE

## 2025-06-03 PROCEDURE — 1160F RVW MEDS BY RX/DR IN RCRD: CPT | Mod: CPTII,S$GLB,, | Performed by: FAMILY MEDICINE

## 2025-06-03 PROCEDURE — 80053 COMPREHEN METABOLIC PANEL: CPT

## 2025-06-03 PROCEDURE — 84439 ASSAY OF FREE THYROXINE: CPT

## 2025-06-03 PROCEDURE — 83036 HEMOGLOBIN GLYCOSYLATED A1C: CPT

## 2025-06-03 PROCEDURE — 80061 LIPID PANEL: CPT

## 2025-06-03 PROCEDURE — 4010F ACE/ARB THERAPY RXD/TAKEN: CPT | Mod: CPTII,S$GLB,, | Performed by: FAMILY MEDICINE

## 2025-06-03 PROCEDURE — 99999 PR PBB SHADOW E&M-EST. PATIENT-LVL III: CPT | Mod: PBBFAC,,, | Performed by: FAMILY MEDICINE

## 2025-06-03 PROCEDURE — 85025 COMPLETE CBC W/AUTO DIFF WBC: CPT

## 2025-06-03 PROCEDURE — 84443 ASSAY THYROID STIM HORMONE: CPT

## 2025-06-03 PROCEDURE — 3008F BODY MASS INDEX DOCD: CPT | Mod: CPTII,S$GLB,, | Performed by: FAMILY MEDICINE

## 2025-06-03 PROCEDURE — 36415 COLL VENOUS BLD VENIPUNCTURE: CPT

## 2025-06-03 PROCEDURE — 3044F HG A1C LEVEL LT 7.0%: CPT | Mod: CPTII,S$GLB,, | Performed by: FAMILY MEDICINE

## 2025-06-03 PROCEDURE — 3075F SYST BP GE 130 - 139MM HG: CPT | Mod: CPTII,S$GLB,, | Performed by: FAMILY MEDICINE

## 2025-06-03 RX ORDER — LISINOPRIL 20 MG/1
20 TABLET ORAL DAILY
Qty: 90 TABLET | Refills: 3 | Status: SHIPPED | OUTPATIENT
Start: 2025-06-03

## 2025-06-03 RX ORDER — ESCITALOPRAM OXALATE 10 MG/1
10 TABLET ORAL DAILY
Qty: 90 TABLET | Refills: 3 | Status: SHIPPED | OUTPATIENT
Start: 2025-06-03 | End: 2026-06-03

## 2025-06-03 RX ORDER — BUSPIRONE HYDROCHLORIDE 10 MG/1
10 TABLET ORAL 3 TIMES DAILY
Qty: 90 TABLET | Refills: 11 | Status: SHIPPED | OUTPATIENT
Start: 2025-06-03 | End: 2026-06-03

## 2025-06-03 RX ORDER — AMLODIPINE BESYLATE 10 MG/1
10 TABLET ORAL DAILY
Qty: 90 TABLET | Refills: 3 | Status: SHIPPED | OUTPATIENT
Start: 2025-06-03

## 2025-06-03 NOTE — PROGRESS NOTES
"[facility]  Annual Exam/Wellness Visit    Patient Information  Perez Calabrese Sr.  34 y.o. 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 His symptoms include social isolation with some days not leaving the house. 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 He denies any prior history of depression or anxiety medication use.    He reports intermittent sharp pain while seated, particularly when driving, present for the past couple months. Pain is positional and occurs specifically during seated activities.    He takes blood pressure medications separately throughout the day with water for better tolerance compared to taking them together. He has 10-15 pills remaining of current blood pressure medications.         Health Maintenance         Date Due Completion Date    COVID-19 Vaccine (7 - 2024-25 season) 09/01/2024 1/9/2024    Influenza Vaccine (Season Ended) 09/01/2025 1/9/2024    Hemoglobin A1c (Prediabetes) 06/03/2026 6/3/2025    TETANUS VACCINE 01/24/2027 1/24/2017    RSV Vaccine (Age 60+ and Pregnant patients) (1 - 1-dose 75+ series) 12/06/2065 ---            Review of Systems   Constitutional:  Positive for activity change. Negative for chills, fatigue and fever.   HENT:  Negative for nasal congestion, ear pain, postnasal drip and sore throat.    Eyes:  Negative for visual disturbance.   Respiratory:  Negative for cough, shortness of breath and wheezing.    Cardiovascular:  Negative for chest pain and palpitations.   Gastrointestinal:  Negative for abdominal pain, " "change in bowel habit, constipation, diarrhea, nausea and vomiting.   Genitourinary:  Negative for dysuria and hematuria.   Musculoskeletal:  Negative for back pain, leg pain and neck pain.   Neurological:  Negative for dizziness, numbness and headaches.   Hematological:  Negative for adenopathy.   Psychiatric/Behavioral:  Positive for depressed mood. Negative for dysphoric mood, sleep disturbance and suicidal ideas. The patient is not nervous/anxious.         Patient answers are not available for this visit.      Problem List and History  Problem List[1]  Past Medical History[2]  Past Surgical History[3]  Social History     Substance and Sexual Activity   Sexual Activity Yes    Partners: Female    Birth control/protection: OCP     Tobacco Use History[4]  Social History     Substance and Sexual Activity   Alcohol Use Yes    Alcohol/week: 2.0 standard drinks of alcohol    Types: 2 Standard drinks or equivalent per week    Comment: social     Social History     Substance and Sexual Activity   Drug Use Not Currently    Types: Marijuana       Medication List  Current Medications[5]    Objective:  Vitals:    06/03/25 1344 06/03/25 1439   BP: (!) 140/92 136/88   Pulse: 78    SpO2: 97%    Weight: 135 kg (297 lb 9.9 oz)    Height: 5' 10" (1.778 m)        Physical Exam  Vitals reviewed.   Constitutional:       General: He is not in acute distress.     Appearance: Normal appearance. He is obese. He is not ill-appearing.   HENT:      Head: Normocephalic and atraumatic.      Right Ear: Tympanic membrane, ear canal and external ear normal. There is no impacted cerumen.      Left Ear: Tympanic membrane, ear canal and external ear normal. There is no impacted cerumen.      Nose: Nose normal. No congestion or rhinorrhea.      Mouth/Throat:      Mouth: Mucous membranes are moist.      Pharynx: Oropharynx is clear. No oropharyngeal exudate or posterior oropharyngeal erythema.   Eyes:      General: No scleral icterus.        Right " eye: No discharge.         Left eye: No discharge.      Conjunctiva/sclera: Conjunctivae normal.   Neck:      Thyroid: No thyroid mass, thyromegaly or thyroid tenderness.   Cardiovascular:      Rate and Rhythm: Normal rate and regular rhythm.      Pulses: Normal pulses.      Heart sounds: Normal heart sounds. No murmur heard.     No friction rub. No gallop.   Pulmonary:      Effort: Pulmonary effort is normal. No respiratory distress.      Breath sounds: Normal breath sounds. No stridor. No wheezing, rhonchi or rales.   Abdominal:      General: Abdomen is flat. Bowel sounds are normal. There is no distension.      Palpations: Abdomen is soft. There is no mass.      Tenderness: There is no abdominal tenderness. There is no guarding.      Hernia: No hernia is present.   Musculoskeletal:         General: No swelling or deformity. Normal range of motion.      Cervical back: Normal range of motion and neck supple. No tenderness.   Lymphadenopathy:      Cervical: No cervical adenopathy.   Skin:     General: Skin is warm and dry.   Neurological:      General: No focal deficit present.      Mental Status: He is alert and oriented to person, place, and time. Mental status is at baseline.      Gait: Gait normal.      Deep Tendon Reflexes: Reflexes normal.   Psychiatric:         Mood and Affect: Mood normal.         Behavior: Behavior normal.         Thought Content: Thought content normal.         Judgment: Judgment normal.         Lab Results  CBC  Lab Results   Component Value Date    WBC 7.84 06/03/2025    RBC 5.67 06/03/2025    HGB 16.1 06/03/2025    HCT 50.9 06/03/2025    MCV 90 06/03/2025    MCH 28.4 06/03/2025    MCHC 31.6 (L) 06/03/2025    RDW 13.6 06/03/2025     06/03/2025    MPV 10.7 06/03/2025    GRAN 4.7 08/20/2015    GRAN 54.3 08/20/2015    LYMPH 31.0 06/03/2025    LYMPH 2.43 06/03/2025    MONO 8.5 06/03/2025    MONO 0.67 06/03/2025    EOS 0.6 06/03/2025    EOS 0.05 06/03/2025    BASO 0.02 08/20/2015     EOSINOPHIL 1.0 08/20/2015    BASOPHIL 0.4 06/03/2025    BASOPHIL 0.03 06/03/2025       CMP    Chemistry        Component Value Date/Time     06/03/2025 1452     01/09/2024 1111    K 3.9 06/03/2025 1452    K 4.2 01/09/2024 1111     06/03/2025 1452     01/09/2024 1111    CO2 25 06/03/2025 1452    CO2 22 (L) 01/09/2024 1111    BUN 9 06/03/2025 1452    CREATININE 1.1 06/03/2025 1452    GLU 87 06/03/2025 1452     (H) 01/09/2024 1111        Component Value Date/Time    CALCIUM 9.6 06/03/2025 1452    CALCIUM 9.6 01/09/2024 1111    ALKPHOS 63 06/03/2025 1452    ALKPHOS 67 01/09/2024 1111    AST 25 06/03/2025 1452    AST 22 01/09/2024 1111    ALT 35 06/03/2025 1452    ALT 35 01/09/2024 1111    BILITOT 0.5 06/03/2025 1452    BILITOT 0.4 01/09/2024 1111    ESTGFRAFRICA >60.0 08/20/2015 0855    EGFRNONAA >60.0 08/20/2015 0855            Lipids  Lab Results   Component Value Date    CHOL 150 06/03/2025    CHOL 217 (H) 01/09/2024    CHOL 161 08/20/2015      Lab Results   Component Value Date    HDL 31 (L) 06/03/2025    HDL 41 01/09/2024    HDL 32 (L) 08/20/2015     Lab Results   Component Value Date    LDLCALC 89.8 06/03/2025    LDLCALC 145.8 01/09/2024    LDLCALC 107.4 08/20/2015      Lab Results   Component Value Date    TRIG 146 06/03/2025    TRIG 151 (H) 01/09/2024    TRIG 108 08/20/2015     Lab Results   Component Value Date    TOTALCHOLEST 4.8 06/03/2025    TOTALCHOLEST 5.3 (H) 01/09/2024    TOTALCHOLEST 5.0 08/20/2015     Lab Results   Component Value Date    NONHDLCHOL 119 06/03/2025    NONHDLCHOL 176 01/09/2024    NONHDLCHOL 129 08/20/2015     Lab Results   Component Value Date    CHOLHDL 20.7 06/03/2025    CHOLHDL 18.9 (L) 01/09/2024    CHOLHDL 19.9 (L) 08/20/2015       Thyroid Function  Lab Results   Component Value Date    TSH 0.414 06/03/2025    FREET4 1.22 06/03/2025         Diabetes Screen  Lab Results   Component Value Date    HGBA1C 6.0 (H) 06/03/2025       Other Labs  Lab Results  "  Component Value Date    HIV1X2 Negative 04/12/2013    [id number] Non-reactive 01/09/2024       Assessment and Plan:    ICD-10-CM ICD-9-CM   1. Encounter for annual general medical examination without abnormal findings in adult  Z00.00 V70.0   2. Class 3 severe obesity due to excess calories with serious comorbidity and body mass index (BMI) of 40.0 to 44.9 in adult  E66.813 278.01    E66.01 V85.41    Z68.41    3. Primary hypertension  I10 401.9   4. Positive depression screening  Z13.31 796.4     Orders Placed This Encounter    Comprehensive Metabolic Panel    CBC Auto Differential    Lipid Panel    Hemoglobin A1C    TSH    T4, Free    EScitalopram oxalate (LEXAPRO) 10 MG tablet    busPIRone (BUSPAR) 10 MG tablet    amLODIPine (NORVASC) 10 MG tablet    lisinopriL (PRINIVIL,ZESTRIL) 20 MG tablet       Assessment & Plan    - Assessed reported depressive symptoms and determined low-dose daily antidepressant with as-needed anxiolytic would be appropriate initial treatment, explaining gradual onset of effect typically noticeable within 2-4 weeks and subtle nature of effects while emphasizing that medication provides tools to better manage challenges.  - Considered occupation as  when selecting medications, prioritizing options that would not impair driving ability.  - Evaluated BP control, noting improvement from previous visits despite elevated reading today likely due to white coat effect.  - Reviewed recent weight loss as positive factor that may impact A1C results.  - Discussed potential management strategies if A1C indicates progression to diabetes, including weight loss medications or oral hypoglycemics.  - Assessed intermittent pain reported, suspecting it may be due to pinched superficial nerve related to positioning while driving.  - Clarified that as-needed anxiety medication would not cause impairment or "high" feeling.  - Discussed potential for Mounjaro to treat multiple conditions beyond " diabetes, including sleep apnea and substance use disorders    1. Encounter for annual general medical examination without abnormal findings in adult (Primary)  Health maintenance updated  Chronic issues reviewed  Fasting labs ordered  - Comprehensive Metabolic Panel; Future  - CBC Auto Differential; Future  - Lipid Panel; Future  - Hemoglobin A1C; Future  - TSH; Future  - T4, Free; Future    2. Class 3 severe obesity due to excess calories with serious comorbidity and body mass index (BMI) of 40.0 to 44.9 in adult  Encourage healthy diet and exercise habits to optimize health and minimize chance of comorbidity development  Comorbid HTN    3. Primary hypertension  Stable, controlled  Continue and refill amlodipine and lisinopril  - amLODIPine (NORVASC) 10 MG tablet; Take 1 tablet (10 mg total) by mouth once daily.  Dispense: 90 tablet; Refill: 3  - lisinopriL (PRINIVIL,ZESTRIL) 20 MG tablet; Take 1 tablet (20 mg total) by mouth once daily.  Dispense: 90 tablet; Refill: 3    4. Positive depression screening  Exam merits tx  Start lexapro 10mg, plan to titrate pending efficacy in 4-6 weeks  Start buspar 10mg up to TID PRN  - EScitalopram oxalate (LEXAPRO) 10 MG tablet; Take 1 tablet (10 mg total) by mouth once daily.  Dispense: 90 tablet; Refill: 3  - busPIRone (BUSPAR) 10 MG tablet; Take 1 tablet (10 mg total) by mouth 3 (three) times daily.  Dispense: 90 tablet; Refill: 11        Check-Out:  Follow-Up  No follow-ups on file.    Upcoming Appointments  Future Appointments   Date Time Provider Department Center   6/18/2025  9:30 AM Delmar Christiansen PA-C [facility]         Suhas Paige MD, FAAFP  Family Medicine Physician  [facility]  6/3/2025      This note was generated with the assistance of ambient listening technology. Verbal consent was obtained by the patient and accompanying visitor(s) for the recording of patient appointment to facilitate this note. I attest  to having reviewed and edited the generated note for accuracy, though some syntax or spelling errors may persist. Please contact the author of this note for any clarification.                                   [1]   Patient Active Problem List  Diagnosis    Class 3 severe obesity with serious comorbidity and body mass index (BMI) of 40.0 to 44.9 in adult   [2]   Past Medical History:  Diagnosis Date    Allergy     Phimosis    [3]   Past Surgical History:  Procedure Laterality Date    EPIDURAL STEROID INJECTION INTO LUMBAR SPINE Right 2/2/2024    Procedure: RT L5-S1 TFESI;  Surgeon: Yesenia Harrell MD;  Location: [facility];  Service: Pain Management;  Laterality: Right;  20 mins    shoulder labrum Right 2009    SHOULDER SURGERY      SHOULDER SURGERY Right    [4]   Social History  Tobacco Use   Smoking Status Never   Smokeless Tobacco Never   [5]   Current Outpatient Medications:     cyclobenzaprine (FLEXERIL) 10 MG tablet, Take 10 mg by mouth 3 (three) times daily., Disp: , Rfl:     amLODIPine (NORVASC) 10 MG tablet, Take 1 tablet (10 mg total) by mouth once daily., Disp: 90 tablet, Rfl: 3    busPIRone (BUSPAR) 10 MG tablet, Take 1 tablet (10 mg total) by mouth 3 (three) times daily., Disp: 90 tablet, Rfl: 11    EScitalopram oxalate (LEXAPRO) 10 MG tablet, Take 1 tablet (10 mg total) by mouth once daily., Disp: 90 tablet, Rfl: 3    gabapentin (NEURONTIN) 300 MG capsule, Take 1 capsule (300 mg total) by mouth 3 (three) times daily., Disp: 90 capsule, Rfl: 11    lisinopriL (PRINIVIL,ZESTRIL) 20 MG tablet, Take 1 tablet (20 mg total) by mouth once daily., Disp: 90 tablet, Rfl: 3    meloxicam (MOBIC) 15 MG tablet, Take 15 mg by mouth daily as needed for Pain. (Patient not taking: Reported on 6/3/2025), Disp: , Rfl:

## 2025-06-07 ENCOUNTER — RESULTS FOLLOW-UP (OUTPATIENT)
Dept: INTERNAL MEDICINE | Facility: CLINIC | Age: 35
End: 2025-06-07

## 2025-06-18 ENCOUNTER — OFFICE VISIT (OUTPATIENT)
Dept: SLEEP MEDICINE | Facility: CLINIC | Age: 35
End: 2025-06-18
Payer: COMMERCIAL

## 2025-06-18 VITALS
DIASTOLIC BLOOD PRESSURE: 82 MMHG | HEIGHT: 70 IN | HEART RATE: 60 BPM | WEIGHT: 298.94 LBS | BODY MASS INDEX: 42.8 KG/M2 | SYSTOLIC BLOOD PRESSURE: 110 MMHG

## 2025-06-18 DIAGNOSIS — F51.09 OTHER INSOMNIA NOT DUE TO A SUBSTANCE OR KNOWN PHYSIOLOGICAL CONDITION: ICD-10-CM

## 2025-06-18 DIAGNOSIS — G47.33 OSA (OBSTRUCTIVE SLEEP APNEA): Primary | ICD-10-CM

## 2025-06-18 DIAGNOSIS — Z78.9 INTOLERANCE OF CONTINUOUS POSITIVE AIRWAY PRESSURE (CPAP) VENTILATION: ICD-10-CM

## 2025-06-18 DIAGNOSIS — R35.1 NOCTURIA: ICD-10-CM

## 2025-06-18 PROCEDURE — 1159F MED LIST DOCD IN RCRD: CPT | Mod: CPTII,S$GLB,, | Performed by: PHYSICIAN ASSISTANT

## 2025-06-18 PROCEDURE — 3079F DIAST BP 80-89 MM HG: CPT | Mod: CPTII,S$GLB,, | Performed by: PHYSICIAN ASSISTANT

## 2025-06-18 PROCEDURE — 1160F RVW MEDS BY RX/DR IN RCRD: CPT | Mod: CPTII,S$GLB,, | Performed by: PHYSICIAN ASSISTANT

## 2025-06-18 PROCEDURE — 4010F ACE/ARB THERAPY RXD/TAKEN: CPT | Mod: CPTII,S$GLB,, | Performed by: PHYSICIAN ASSISTANT

## 2025-06-18 PROCEDURE — 3044F HG A1C LEVEL LT 7.0%: CPT | Mod: CPTII,S$GLB,, | Performed by: PHYSICIAN ASSISTANT

## 2025-06-18 PROCEDURE — 3074F SYST BP LT 130 MM HG: CPT | Mod: CPTII,S$GLB,, | Performed by: PHYSICIAN ASSISTANT

## 2025-06-18 PROCEDURE — 99214 OFFICE O/P EST MOD 30 MIN: CPT | Mod: S$GLB,,, | Performed by: PHYSICIAN ASSISTANT

## 2025-06-18 PROCEDURE — 99999 PR PBB SHADOW E&M-EST. PATIENT-LVL III: CPT | Mod: PBBFAC,,, | Performed by: PHYSICIAN ASSISTANT

## 2025-06-18 PROCEDURE — 3008F BODY MASS INDEX DOCD: CPT | Mod: CPTII,S$GLB,, | Performed by: PHYSICIAN ASSISTANT

## 2025-06-18 NOTE — PROGRESS NOTES
ESTABLISHED PATIENT VISIT    Perez Calabrese Sr.  is a pleasant 34 y.o. 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 Was advised he would need sleep study in evaluation for TREY, which is why he presents today. Denies snoring or witnessed apneas. Denies excessive daytime sleepiness, but does report occasional afternoon nap. Does endorse family history of TREY (mother)  STOP BANG:  Snoring? Denies  Tired? Denies  Observed apneas? Denies  Pressure (dx of HTN)? Yes  BMI >35? Yes  Age older than 50? No  Neck Size >40cm/17in? Yes  Gender=Male? Yes  TREY - Intermediate Risk : Yes to 3 - 4 questions      Since last visit:   Using CPAP regularly with improvement in sx. Having issue ripping off CPAP in the middle of the night. Denies mask leak or discomfort, just reports it gets harder to breath with the CPAP machine after a while. 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       PAP history   Problems    Mask FFM (tolerating well, but wants to try something different)   Pressure 5-20cwp   DME HME   Machine age 2023 Kasey II   Download N/A, no remote access         SLEEP SCHEDULE   Environment    Bed Time 9-10PM   Sleep Latency 30-60mins   Arousals 1   Nocturia 1   Back to sleep 10mins   Wake time 7-8AM   Naps Occasional 30min afternoon nap   Work          Past Medical History:   Diagnosis Date    Allergy     Phimosis      Patient Active Problem List   Diagnosis    Class 3 severe obesity with serious comorbidity and body mass index (BMI) of 40.0 to 44.9 in adult       Current Outpatient Medications:     amLODIPine (NORVASC) 10 MG tablet, Take 1 tablet (10 mg total) by mouth once daily., Disp: 90 tablet, Rfl: 3    busPIRone (BUSPAR) 10 MG tablet, Take 1 tablet (10 mg total) by mouth 3 (three) times daily., Disp: 90 tablet, Rfl: 11    cyclobenzaprine  "(FLEXERIL) 10 MG tablet, Take 10 mg by mouth 3 (three) times daily., Disp: , Rfl:     EScitalopram oxalate (LEXAPRO) 10 MG tablet, Take 1 tablet (10 mg total) by mouth once daily., Disp: 90 tablet, Rfl: 3    lisinopriL (PRINIVIL,ZESTRIL) 20 MG tablet, Take 1 tablet (20 mg total) by mouth once daily., Disp: 90 tablet, Rfl: 3    gabapentin (NEURONTIN) 300 MG capsule, Take 1 capsule (300 mg total) by mouth 3 (three) times daily., Disp: 90 capsule, Rfl: 11    meloxicam (MOBIC) 15 MG tablet, Take 15 mg by mouth daily as needed for Pain. (Patient not taking: Reported on 2025), Disp: , Rfl:        Vitals:    25 0921   BP: 110/82   BP Location: Right arm   Patient Position: Sitting   Pulse: 60   Weight: 135.6 kg (298 lb 15.1 oz)   Height: 5' 10" (1.778 m)     Physical Exam:    GEN:   Well-appearing  Psych:  Appropriate affect, demonstrates insight  SKIN:  No rash on the face or bridge of the nose      LABS:   Lab Results   Component Value Date    HGB 16.1 2025    CO2 25 2025         RECORDS REVIEWED:    HST 3/22/24: AHI 33, RDI 45    Previous sleep notes: 3/19/24    ASSESSMENT    Victoria Sleepiness Scale:  Sitting and readin  Watching TV:    1  Passenger in a car x 1 hr:  0  Sitting quietly after lunch:  0  Lying down to rest in PM:  3  Sitting, inactive in public:  0  Sitting+ talking to someone:  0  Stopped in traffic:   0  Total        PROBLEM DESCRIPTION/ Sx on Presentation Interval Hx STATUS   TREY   denies snoring, denies witnessed apneas  Mother has TREY on CPAP  Reports nightly usage with improvement in sx when able to tolerate Likely not fully controlled   Daytime Sx   + occasional sleepiness when inactive   Denies accidental dozing or drowsiness when driving  Reports will occasionally take afternoon nap if he has the opportunity   ESS  on intake (reviewed from 3/19/24) Feels well rested on cPAP improved   Insomnia   Trouble falling asleep: 30-60mins  Arousals:         for " nocturia  Hard to get back to sleep?: not usually (10mins)    Prior pertinent medications:  Current pertinent medications:  Sleep is more consolidated on CPAP improved   Nocturia   x 1 per sleep period 0-1 x nightly stable   Other issues:       PLAN      -using and benefiting from CPAP therapy when able to tolerate  -continue CPAP nightly  -recommended CPAP titration given CPAP pressure intolerance despite comfortable mask interface, patient is open  -CPAP titration ordered   -discussed goals of CPAP usage 7-9hours nightly with usage >6hrs 100% of nights and minimal usage of >4hrs 70% of nights recommended  -CPAP supplies ordered  -discussed TREY and PAP with patient in detail, including possible complications of untreated TREY like heart attack/stroke  -advised on strict driving precautions; advised never to drive drowsy  -unable to obtain 90 day usage data, will reach out to [facility] for compliance report     Advised on plan of care. Answered all patient questions. Patient verbalized understanding and voiced agreement with plan of care.     RTC   following CPAP titration        The patient was given open opportunity to ask questions and/or express concerns about treatment plan.  All questions/concerns were discussed.     Two patient identifiers used prior to evaluation.

## 2025-07-09 ENCOUNTER — TELEPHONE (OUTPATIENT)
Dept: SLEEP MEDICINE | Facility: OTHER | Age: 35
End: 2025-07-09
Payer: COMMERCIAL

## 2025-07-11 ENCOUNTER — TELEPHONE (OUTPATIENT)
Dept: SLEEP MEDICINE | Facility: OTHER | Age: 35
End: 2025-07-11
Payer: COMMERCIAL

## 2025-08-18 ENCOUNTER — E-VISIT (OUTPATIENT)
Dept: URGENT CARE | Facility: CLINIC | Age: 35
End: 2025-08-18
Payer: COMMERCIAL

## 2025-08-18 DIAGNOSIS — R30.0 DYSURIA: ICD-10-CM

## 2025-08-18 DIAGNOSIS — J01.00 SUBACUTE MAXILLARY SINUSITIS: Primary | ICD-10-CM

## 2025-08-18 RX ORDER — CEPHALEXIN 500 MG/1
500 CAPSULE ORAL EVERY 12 HOURS
Qty: 14 CAPSULE | Refills: 0 | Status: SHIPPED | OUTPATIENT
Start: 2025-08-18 | End: 2025-08-25

## 2025-08-18 RX ORDER — DESLORATADINE 5 MG/1
5 TABLET, ORALLY DISINTEGRATING ORAL DAILY
Qty: 7 TABLET | Refills: 0 | Status: SHIPPED | OUTPATIENT
Start: 2025-08-18 | End: 2025-08-25

## 2025-08-29 ENCOUNTER — TELEPHONE (OUTPATIENT)
Dept: SLEEP MEDICINE | Facility: OTHER | Age: 35
End: 2025-08-29
Payer: COMMERCIAL

## (undated) DEVICE — BANDAGE ADHESIVE